# Patient Record
Sex: FEMALE | Race: WHITE | Employment: OTHER | URBAN - METROPOLITAN AREA
[De-identification: names, ages, dates, MRNs, and addresses within clinical notes are randomized per-mention and may not be internally consistent; named-entity substitution may affect disease eponyms.]

---

## 2017-08-03 ENCOUNTER — HOSPITAL ENCOUNTER (OUTPATIENT)
Dept: CT IMAGING | Facility: HOSPITAL | Age: 77
Discharge: HOME | End: 2017-08-03
Attending: INTERNAL MEDICINE
Payer: MEDICARE

## 2017-08-03 DIAGNOSIS — R51.9 HEAD ACHE: ICD-10-CM

## 2017-08-03 DIAGNOSIS — R42 DIZZINESS: ICD-10-CM

## 2017-08-03 PROCEDURE — 70450 CT HEAD/BRAIN W/O DYE: CPT | Performed by: INTERNAL MEDICINE

## 2017-12-08 ENCOUNTER — HOSPITAL ENCOUNTER (EMERGENCY)
Facility: HOSPITAL | Age: 77
Discharge: HOME | End: 2017-12-08
Attending: EMERGENCY MEDICINE
Payer: MEDICARE

## 2017-12-08 ENCOUNTER — APPOINTMENT (OUTPATIENT)
Dept: MRI IMAGING | Facility: HOSPITAL | Age: 77
End: 2017-12-08
Attending: EMERGENCY MEDICINE
Payer: MEDICARE

## 2017-12-08 ENCOUNTER — APPOINTMENT (OUTPATIENT)
Dept: GENERAL RADIOLOGY | Facility: HOSPITAL | Age: 77
End: 2017-12-08
Attending: EMERGENCY MEDICINE
Payer: MEDICARE

## 2017-12-08 VITALS
TEMPERATURE: 98 F | HEART RATE: 65 BPM | SYSTOLIC BLOOD PRESSURE: 128 MMHG | DIASTOLIC BLOOD PRESSURE: 77 MMHG | OXYGEN SATURATION: 97 % | WEIGHT: 140 LBS | RESPIRATION RATE: 16 BRPM

## 2017-12-08 DIAGNOSIS — R42 VERTIGO: Primary | ICD-10-CM

## 2017-12-08 PROCEDURE — 70549 MR ANGIOGRAPH NECK W/O&W/DYE: CPT | Performed by: EMERGENCY MEDICINE

## 2017-12-08 PROCEDURE — 85025 COMPLETE CBC W/AUTO DIFF WBC: CPT | Performed by: EMERGENCY MEDICINE

## 2017-12-08 PROCEDURE — 70546 MR ANGIOGRAPH HEAD W/O&W/DYE: CPT | Performed by: EMERGENCY MEDICINE

## 2017-12-08 PROCEDURE — A9575 INJ GADOTERATE MEGLUMI 0.1ML: HCPCS | Performed by: EMERGENCY MEDICINE

## 2017-12-08 PROCEDURE — 96374 THER/PROPH/DIAG INJ IV PUSH: CPT

## 2017-12-08 PROCEDURE — 93005 ELECTROCARDIOGRAM TRACING: CPT

## 2017-12-08 PROCEDURE — 82962 GLUCOSE BLOOD TEST: CPT

## 2017-12-08 PROCEDURE — 85610 PROTHROMBIN TIME: CPT | Performed by: EMERGENCY MEDICINE

## 2017-12-08 PROCEDURE — 85730 THROMBOPLASTIN TIME PARTIAL: CPT | Performed by: EMERGENCY MEDICINE

## 2017-12-08 PROCEDURE — 99285 EMERGENCY DEPT VISIT HI MDM: CPT

## 2017-12-08 PROCEDURE — 70553 MRI BRAIN STEM W/O & W/DYE: CPT | Performed by: EMERGENCY MEDICINE

## 2017-12-08 PROCEDURE — 84484 ASSAY OF TROPONIN QUANT: CPT | Performed by: EMERGENCY MEDICINE

## 2017-12-08 PROCEDURE — 71010 XR CHEST AP PORTABLE  (CPT=71010): CPT | Performed by: EMERGENCY MEDICINE

## 2017-12-08 PROCEDURE — 80053 COMPREHEN METABOLIC PANEL: CPT | Performed by: EMERGENCY MEDICINE

## 2017-12-08 PROCEDURE — 93010 ELECTROCARDIOGRAM REPORT: CPT

## 2017-12-08 RX ORDER — MECLIZINE HYDROCHLORIDE 25 MG/1
25 TABLET ORAL 3 TIMES DAILY PRN
Qty: 20 TABLET | Refills: 0 | Status: SHIPPED | OUTPATIENT
Start: 2017-12-08

## 2017-12-08 RX ORDER — ONDANSETRON 8 MG/1
8 TABLET, ORALLY DISINTEGRATING ORAL EVERY 6 HOURS PRN
Qty: 10 TABLET | Refills: 0 | Status: SHIPPED | OUTPATIENT
Start: 2017-12-08 | End: 2017-12-15

## 2017-12-08 RX ORDER — MECLIZINE HYDROCHLORIDE 25 MG/1
25 TABLET ORAL ONCE
Status: DISCONTINUED | OUTPATIENT
Start: 2017-12-08 | End: 2017-12-08

## 2017-12-08 RX ORDER — ONDANSETRON 2 MG/ML
4 INJECTION INTRAMUSCULAR; INTRAVENOUS
Status: DISCONTINUED | OUTPATIENT
Start: 2017-12-08 | End: 2017-12-08

## 2017-12-08 NOTE — ED INITIAL ASSESSMENT (HPI)
Patient arrives from home with  for dizziness onset 1 hr ago as well as speech changes. Reports she is speaking more slowly and pausing between words.  Reports several similar episodes in past, which ended up all being vertigo and was prescribed mecl

## 2017-12-08 NOTE — ED PROVIDER NOTES
Patient Seen in: [facility] Emergency Department    History   Patient presents with:  Stroke (neurologic)    Stated Complaint: slurred speech and dizzy for 1 hour    HPI    History is obtained from patient's son.   About 1 and half hours ago,   BETTYE appropriately. She is oriented times person and place but not the exact day. Her speech according to her son is slow but not slurred and no inappropriate words. There is facial symmetry.   Sensation in the face intact light touch  Eyes: sclera white, con Abnormality         Status                     ---------                               -----------         ------                     CBC W/ DIFFERENTIAL[[id number]]          Normal              Final result                 Please view results for these gissell criteria    On examination, patient appear comfortable. At this point, I believe patient may be safely discharged home. I recommend rest, fluids, light diet, Zofran for nausea, Antivert, and dizziness exercises.   I recommend follow-up with primary care d

## 2017-12-11 ENCOUNTER — TELEPHONE (OUTPATIENT)
Dept: NEUROLOGY | Facility: CLINIC | Age: 77
End: 2017-12-11

## 2019-11-08 ENCOUNTER — HOSPITAL ENCOUNTER (OUTPATIENT)
Facility: HOSPITAL | Age: 79
Setting detail: HOSPITAL OUTPATIENT SURGERY
Discharge: HOME | End: 2019-11-08
Attending: INTERNAL MEDICINE | Admitting: INTERNAL MEDICINE
Payer: MEDICARE

## 2019-11-08 ENCOUNTER — ANESTHESIA (OUTPATIENT)
Dept: ENDOSCOPY | Facility: HOSPITAL | Age: 79
End: 2019-11-08
Payer: MEDICARE

## 2019-11-08 ENCOUNTER — ANESTHESIA EVENT (OUTPATIENT)
Dept: ENDOSCOPY | Facility: HOSPITAL | Age: 79
End: 2019-11-08
Payer: MEDICARE

## 2019-11-08 VITALS
WEIGHT: 140 LBS | SYSTOLIC BLOOD PRESSURE: 154 MMHG | HEIGHT: 62 IN | BODY MASS INDEX: 25.76 KG/M2 | HEART RATE: 59 BPM | OXYGEN SATURATION: 100 % | TEMPERATURE: 98 F | RESPIRATION RATE: 18 BRPM | DIASTOLIC BLOOD PRESSURE: 79 MMHG

## 2019-11-08 DIAGNOSIS — Z12.11 SCREENING FOR COLON CANCER: ICD-10-CM

## 2019-11-08 PROCEDURE — 0DJD8ZZ INSPECTION OF LOWER INTESTINAL TRACT, VIA NATURAL OR ARTIFICIAL OPENING ENDOSCOPIC: ICD-10-PCS | Performed by: INTERNAL MEDICINE

## 2019-11-08 RX ORDER — LIDOCAINE HYDROCHLORIDE 10 MG/ML
INJECTION, SOLUTION EPIDURAL; INFILTRATION; INTRACAUDAL; PERINEURAL AS NEEDED
Status: DISCONTINUED | OUTPATIENT
Start: 2019-11-08 | End: 2019-11-08 | Stop reason: SURG

## 2019-11-08 RX ORDER — SODIUM CHLORIDE, SODIUM LACTATE, POTASSIUM CHLORIDE, CALCIUM CHLORIDE 600; 310; 30; 20 MG/100ML; MG/100ML; MG/100ML; MG/100ML
INJECTION, SOLUTION INTRAVENOUS CONTINUOUS
Status: DISCONTINUED | OUTPATIENT
Start: 2019-11-08 | End: 2019-11-08

## 2019-11-08 RX ORDER — NALOXONE HYDROCHLORIDE 0.4 MG/ML
80 INJECTION, SOLUTION INTRAMUSCULAR; INTRAVENOUS; SUBCUTANEOUS AS NEEDED
Status: DISCONTINUED | OUTPATIENT
Start: 2019-11-08 | End: 2019-11-08

## 2019-11-08 RX ADMIN — LIDOCAINE HYDROCHLORIDE 50 MG: 10 INJECTION, SOLUTION EPIDURAL; INFILTRATION; INTRACAUDAL; PERINEURAL at 10:11:00

## 2019-11-08 NOTE — OPERATIVE REPORT
Operative Report-Colonoscopy    Luz Marina Griffin 11/20/1940   [facility] [id number] MRN [id number]   Admission Date 11/8/2019 Operation Date 11/8/2019   Attending Physician Boom Cifuentes DO Operating Physician Verónica Cuellar DO     PREOPERATIVE DIAGNOSIS/

## 2019-11-08 NOTE — H&P
History & Physical Examination    Patient Name: Dayana Diggs  MRN: [id number]  CSN: [id number]  YOB: 1940    Diagnosis: screening    Present Illness: as above    metoprolol succinate 12.5 mg Oral Tab, Take 12.5 mg by mouth Daily Beta Bl

## 2019-11-08 NOTE — ANESTHESIA PREPROCEDURE EVALUATION
PRE-OP EVALUATION    Patient Name: Cheryle Lin    Pre-op Diagnosis: Screening for colon cancer [Z12.11]    Procedure(s):  COLONOSCOPY    Surgeon(s) and Role:     * Jose Trevizo, DO - Primary    Pre-op vitals reviewed.   Temp: 98.4 °F (36.9 °C)  P MAC  NPO status verified and patient meets guidelines. Post-procedure pain management plan discussed with surgeon and patient.       Plan/risks discussed with: patient                Options, risks and benefits of anesthesia as outlined in the anesthesia

## 2019-11-08 NOTE — ANESTHESIA POSTPROCEDURE EVALUATION
[facility] Patient Status:  Hospital Outpatient Surgery   Age/Gender 69year old female MRN [id number]   [facility] ENDOSCOPY Attending Ruperto Alanis, [address] [facility] Day # 0 PCP Conner Valdes MD       Anesth

## 2020-12-22 ENCOUNTER — WALK IN (OUTPATIENT)
Dept: URGENT CARE | Age: 80
End: 2020-12-22
Attending: FAMILY MEDICINE

## 2020-12-22 VITALS
RESPIRATION RATE: 18 BRPM | HEART RATE: 68 BPM | TEMPERATURE: 96.5 F | OXYGEN SATURATION: 100 % | DIASTOLIC BLOOD PRESSURE: 83 MMHG | SYSTOLIC BLOOD PRESSURE: 170 MMHG

## 2020-12-22 DIAGNOSIS — Z20.822 SUSPECTED COVID-19 VIRUS INFECTION: Primary | ICD-10-CM

## 2020-12-22 DIAGNOSIS — R43.2 LOSS OF TASTE: ICD-10-CM

## 2020-12-22 DIAGNOSIS — R52 BODY ACHES: ICD-10-CM

## 2020-12-22 PROCEDURE — C9803 HOPD COVID-19 SPEC COLLECT: HCPCS

## 2020-12-22 PROCEDURE — U0005 INFEC AGEN DETEC AMPLI PROBE: HCPCS | Performed by: FAMILY MEDICINE

## 2020-12-22 PROCEDURE — 99202 OFFICE O/P NEW SF 15 MIN: CPT

## 2020-12-23 ENCOUNTER — TELEPHONE (OUTPATIENT)
Dept: SCHEDULING | Age: 80
End: 2020-12-23

## 2020-12-23 LAB
SARS-COV-2 RNA RESP QL NAA+PROBE: NOT DETECTED
SERVICE CMNT-IMP: NORMAL
SERVICE CMNT-IMP: NORMAL

## 2020-12-24 ASSESSMENT — ENCOUNTER SYMPTOMS
SHORTNESS OF BREATH: 0
DIARRHEA: 0
FEVER: 0
DIZZINESS: 0
VOMITING: 0
NAUSEA: 0
SINUS PRESSURE: 0
COUGH: 0
AGITATION: 0
ABDOMINAL PAIN: 0
FATIGUE: 1
ADENOPATHY: 0
WEAKNESS: 0

## 2020-12-29 ENCOUNTER — TELEPHONE (OUTPATIENT)
Dept: SCHEDULING | Age: 80
End: 2020-12-29

## 2024-01-23 PROBLEM — I10 ESSENTIAL (PRIMARY) HYPERTENSION: Status: ACTIVE | Noted: 2024-01-23

## 2024-01-23 PROBLEM — E03.8 OTHER SPECIFIED HYPOTHYROIDISM: Status: ACTIVE | Noted: 2024-01-23

## 2024-01-23 PROBLEM — Z00.00 MEDICARE ANNUAL WELLNESS VISIT, INITIAL: Status: ACTIVE | Noted: 2024-01-23

## 2024-01-23 PROBLEM — E78.49 OTHER HYPERLIPIDEMIA: Status: ACTIVE | Noted: 2024-01-23

## 2024-02-02 PROBLEM — E03.9 HYPOTHYROIDISM: Status: ACTIVE | Noted: 2024-01-23

## 2024-02-02 PROBLEM — E78.5 HYPERLIPIDEMIA: Status: ACTIVE | Noted: 2024-01-23

## 2024-02-02 PROBLEM — I83.812 VARICOSE VEINS OF LEFT LOWER EXTREMITY WITH PAIN: Status: ACTIVE | Noted: 2024-02-02

## 2024-02-06 PROBLEM — N39.41 URGE INCONTINENCE OF URINE: Status: ACTIVE | Noted: 2024-02-06

## 2024-03-06 ENCOUNTER — APPOINTMENT (OUTPATIENT)
Dept: CARDIOLOGY | Age: 84
End: 2024-03-06

## 2024-03-06 VITALS
DIASTOLIC BLOOD PRESSURE: 80 MMHG | HEART RATE: 53 BPM | WEIGHT: 145.83 LBS | HEIGHT: 58 IN | SYSTOLIC BLOOD PRESSURE: 140 MMHG | OXYGEN SATURATION: 100 % | RESPIRATION RATE: 16 BRPM | BODY MASS INDEX: 30.61 KG/M2

## 2024-03-06 DIAGNOSIS — I83.812 VARICOSE VEINS OF LEFT LOWER EXTREMITY WITH PAIN: Primary | ICD-10-CM

## 2024-03-06 DIAGNOSIS — I10 ESSENTIAL HYPERTENSION: ICD-10-CM

## 2024-03-06 DIAGNOSIS — E78.2 MIXED HYPERLIPIDEMIA: ICD-10-CM

## 2024-03-06 DIAGNOSIS — I49.3 FREQUENT PVCS: ICD-10-CM

## 2024-03-06 DIAGNOSIS — R94.31 ABNORMAL ELECTROCARDIOGRAM (ECG) (EKG): ICD-10-CM

## 2024-03-06 DIAGNOSIS — E03.9 HYPOTHYROIDISM, UNSPECIFIED TYPE: ICD-10-CM

## 2024-03-06 PROCEDURE — 93000 ELECTROCARDIOGRAM COMPLETE: CPT | Performed by: INTERNAL MEDICINE

## 2024-03-06 PROCEDURE — 99205 OFFICE O/P NEW HI 60 MIN: CPT | Performed by: INTERNAL MEDICINE

## 2024-03-06 ASSESSMENT — PATIENT HEALTH QUESTIONNAIRE - PHQ9
CLINICAL INTERPRETATION OF PHQ2 SCORE: NO FURTHER SCREENING NEEDED
CLINICAL INTERPRETATION OF PHQ2 SCORE: NO FURTHER SCREENING NEEDED
SUM OF ALL RESPONSES TO PHQ9 QUESTIONS 1 AND 2: 0
2. FEELING DOWN, DEPRESSED OR HOPELESS: NOT AT ALL
1. LITTLE INTEREST OR PLEASURE IN DOING THINGS: NOT AT ALL
SUM OF ALL RESPONSES TO PHQ9 QUESTIONS 1 AND 2: 0
1. LITTLE INTEREST OR PLEASURE IN DOING THINGS: NOT AT ALL
SUM OF ALL RESPONSES TO PHQ9 QUESTIONS 1 AND 2: 0
SUM OF ALL RESPONSES TO PHQ9 QUESTIONS 1 AND 2: 0
2. FEELING DOWN, DEPRESSED OR HOPELESS: NOT AT ALL

## 2024-04-03 ENCOUNTER — TELEPHONE (OUTPATIENT)
Dept: CARDIOLOGY | Age: 84
End: 2024-04-03

## 2024-04-22 ENCOUNTER — TELEPHONE (OUTPATIENT)
Dept: CARDIOLOGY | Age: 84
End: 2024-04-22

## 2024-04-23 PROBLEM — I83.90 VARICOSE VEINS OF LOWER EXTREMITY: Status: ACTIVE | Noted: 2024-04-23

## 2024-04-25 PROBLEM — L03.116 CELLULITIS OF LEFT LOWER EXTREMITY: Status: ACTIVE | Noted: 2024-04-25

## 2024-04-30 ENCOUNTER — APPOINTMENT (OUTPATIENT)
Dept: CARDIOLOGY | Age: 84
End: 2024-04-30
Attending: INTERNAL MEDICINE

## 2024-04-30 DIAGNOSIS — I49.3 FREQUENT PVCS: ICD-10-CM

## 2024-04-30 DIAGNOSIS — E03.9 HYPOTHYROIDISM, UNSPECIFIED TYPE: ICD-10-CM

## 2024-04-30 DIAGNOSIS — R94.31 ABNORMAL ELECTROCARDIOGRAM (ECG) (EKG): ICD-10-CM

## 2024-04-30 DIAGNOSIS — I10 ESSENTIAL HYPERTENSION: ICD-10-CM

## 2024-04-30 DIAGNOSIS — I83.812 VARICOSE VEINS OF LEFT LOWER EXTREMITY WITH PAIN: ICD-10-CM

## 2024-04-30 DIAGNOSIS — E78.2 MIXED HYPERLIPIDEMIA: ICD-10-CM

## 2024-05-02 ENCOUNTER — APPOINTMENT (OUTPATIENT)
Dept: MRI IMAGING | Facility: HOSPITAL | Age: 84
End: 2024-05-02
Attending: EMERGENCY MEDICINE
Payer: MEDICARE

## 2024-05-02 ENCOUNTER — HOSPITAL ENCOUNTER (EMERGENCY)
Facility: HOSPITAL | Age: 84
Discharge: HOME | End: 2024-05-02
Attending: EMERGENCY MEDICINE
Payer: MEDICARE

## 2024-05-02 VITALS
WEIGHT: 145 LBS | DIASTOLIC BLOOD PRESSURE: 73 MMHG | RESPIRATION RATE: 18 BRPM | HEIGHT: 60 IN | SYSTOLIC BLOOD PRESSURE: 141 MMHG | HEART RATE: 77 BPM | BODY MASS INDEX: 28.47 KG/M2 | OXYGEN SATURATION: 96 % | TEMPERATURE: 97 F

## 2024-05-02 DIAGNOSIS — R51.9 CHRONIC NONINTRACTABLE HEADACHE, UNSPECIFIED HEADACHE TYPE: Primary | ICD-10-CM

## 2024-05-02 DIAGNOSIS — G89.29 CHRONIC NONINTRACTABLE HEADACHE, UNSPECIFIED HEADACHE TYPE: Primary | ICD-10-CM

## 2024-05-02 LAB
ALBUMIN SERPL-MCNC: 3.5 G/DL (ref 3.4–5)
ALBUMIN/GLOB SERPL: 0.8 {RATIO} (ref 1–2)
ALP LIVER SERPL-CCNC: 59 U/L
ALT SERPL-CCNC: 17 U/L
ANION GAP SERPL CALC-SCNC: 5 MMOL/L (ref 0–18)
AST SERPL-CCNC: 14 U/L (ref 15–37)
BASOPHILS # BLD AUTO: 0.05 X10(3) UL (ref 0–0.2)
BASOPHILS NFR BLD AUTO: 0.7 %
BILIRUB SERPL-MCNC: 0.6 MG/DL (ref 0.1–2)
BUN BLD-MCNC: 24 MG/DL (ref 9–23)
CALCIUM BLD-MCNC: 9.1 MG/DL (ref 8.5–10.1)
CHLORIDE SERPL-SCNC: 109 MMOL/L (ref 98–112)
CO2 SERPL-SCNC: 23 MMOL/L (ref 21–32)
CREAT BLD-MCNC: 1.12 MG/DL
EGFRCR SERPLBLD CKD-EPI 2021: 49 ML/MIN/1.73M2 (ref 60–?)
EOSINOPHIL # BLD AUTO: 0.16 X10(3) UL (ref 0–0.7)
EOSINOPHIL NFR BLD AUTO: 2.1 %
ERYTHROCYTE [DISTWIDTH] IN BLOOD BY AUTOMATED COUNT: 12.1 %
ERYTHROCYTE [SEDIMENTATION RATE] IN BLOOD: 23 MM/HR
GLOBULIN PLAS-MCNC: 4.6 G/DL (ref 2.8–4.4)
GLUCOSE BLD-MCNC: 113 MG/DL (ref 70–99)
HCT VFR BLD AUTO: 35.6 %
HGB BLD-MCNC: 12 G/DL
IMM GRANULOCYTES # BLD AUTO: 0.01 X10(3) UL (ref 0–1)
IMM GRANULOCYTES NFR BLD: 0.1 %
LYMPHOCYTES # BLD AUTO: 1.99 X10(3) UL (ref 1–4)
LYMPHOCYTES NFR BLD AUTO: 26.6 %
MCH RBC QN AUTO: 32.6 PG (ref 26–34)
MCHC RBC AUTO-ENTMCNC: 33.7 G/DL (ref 31–37)
MCV RBC AUTO: 96.7 FL
MONOCYTES # BLD AUTO: 0.49 X10(3) UL (ref 0.1–1)
MONOCYTES NFR BLD AUTO: 6.6 %
NEUTROPHILS # BLD AUTO: 4.78 X10 (3) UL (ref 1.5–7.7)
NEUTROPHILS # BLD AUTO: 4.78 X10(3) UL (ref 1.5–7.7)
NEUTROPHILS NFR BLD AUTO: 63.9 %
OSMOLALITY SERPL CALC.SUM OF ELEC: 289 MOSM/KG (ref 275–295)
PLATELET # BLD AUTO: 196 10(3)UL (ref 150–450)
POTASSIUM SERPL-SCNC: 4.7 MMOL/L (ref 3.5–5.1)
PROT SERPL-MCNC: 8.1 G/DL (ref 6.4–8.2)
RBC # BLD AUTO: 3.68 X10(6)UL
SODIUM SERPL-SCNC: 137 MMOL/L (ref 136–145)
T4 FREE SERPL-MCNC: 1 NG/DL (ref 0.8–1.7)
TSI SER-ACNC: 5.69 MIU/ML (ref 0.36–3.74)
WBC # BLD AUTO: 7.5 X10(3) UL (ref 4–11)

## 2024-05-02 PROCEDURE — 85652 RBC SED RATE AUTOMATED: CPT | Performed by: EMERGENCY MEDICINE

## 2024-05-02 PROCEDURE — 99285 EMERGENCY DEPT VISIT HI MDM: CPT

## 2024-05-02 PROCEDURE — 85025 COMPLETE CBC W/AUTO DIFF WBC: CPT | Performed by: EMERGENCY MEDICINE

## 2024-05-02 PROCEDURE — 84439 ASSAY OF FREE THYROXINE: CPT | Performed by: EMERGENCY MEDICINE

## 2024-05-02 PROCEDURE — 36415 COLL VENOUS BLD VENIPUNCTURE: CPT

## 2024-05-02 PROCEDURE — 80053 COMPREHEN METABOLIC PANEL: CPT | Performed by: EMERGENCY MEDICINE

## 2024-05-02 PROCEDURE — 84443 ASSAY THYROID STIM HORMONE: CPT | Performed by: EMERGENCY MEDICINE

## 2024-05-02 PROCEDURE — 70553 MRI BRAIN STEM W/O & W/DYE: CPT | Performed by: EMERGENCY MEDICINE

## 2024-05-02 PROCEDURE — A9575 INJ GADOTERATE MEGLUMI 0.1ML: HCPCS | Performed by: EMERGENCY MEDICINE

## 2024-05-02 RX ORDER — GADOTERATE MEGLUMINE 376.9 MG/ML
15 INJECTION INTRAVENOUS
Status: COMPLETED | OUTPATIENT
Start: 2024-05-02 | End: 2024-05-02

## 2024-05-02 RX ORDER — SOLIFENACIN SUCCINATE 5 MG/1
5 TABLET, FILM COATED ORAL DAILY
COMMUNITY

## 2024-05-02 RX ORDER — NAPROXEN 500 MG/1
500 TABLET ORAL 2 TIMES DAILY PRN
Qty: 20 TABLET | Refills: 0 | Status: SHIPPED | OUTPATIENT
Start: 2024-05-02

## 2024-05-02 RX ORDER — TELMISARTAN 40 MG/1
40 TABLET ORAL DAILY
COMMUNITY

## 2024-05-02 RX ORDER — LOSARTAN POTASSIUM 25 MG/1
40 TABLET ORAL DAILY
COMMUNITY

## 2024-05-02 RX ORDER — CEPHALEXIN 500 MG/1
500 CAPSULE ORAL 2 TIMES DAILY
COMMUNITY

## 2024-05-02 RX ORDER — GABAPENTIN 300 MG/1
300 CAPSULE ORAL NIGHTLY
Qty: 30 CAPSULE | Refills: 0 | Status: SHIPPED | OUTPATIENT
Start: 2024-05-02 | End: 2024-06-01

## 2024-05-02 NOTE — ED INITIAL ASSESSMENT (HPI)
Past 5 months, patient has been having intermittent episodes of headache, hot flashes, head feeling heavy and foggy, today specifically she felt as though she couldn't move her mouth. Patient wearing halter monitor since yesterday due to having dysrhythmias.

## 2024-05-02 NOTE — ED PROVIDER NOTES
Patient Seen in: [facility] Emergency Department      History     Chief Complaint   Patient presents with    Headache     Stated Complaint: Pt presents to ED for intermittent headaches x5 months that have increased in s*    Subjective:   HPI    83 old female presents to the emergency department with complaints of having issues with headaches that been ongoing for about 5 months but today is having some increasing pain.  Per the daughter she was having some issues even trying to open her mouth that seem to be more pain rather than a neurologic deficit.  Patient is also had some issues with vertigo.  She has not had any imaging of her head during all this.  No fevers or chills.  No other focal weakness or numbness.  She does have a history of some mild dementia.  Daughter states that they were told to come in for evaluation if she was having any progression of her headache and subsequently they came in.  No loss of bowel or bladder control.  When she does get the episodes of pain they tend to be more in the morning but can occur at any time.  She states sometimes it involves her neck as well.  No fevers or chills.  No cough cold or congestion.  No other acute complaints    Objective:   Past Medical History:    Chest pain    Constipation    Essential hypertension    Frequent urination    High blood pressure    High cholesterol    Irregular bowel habits    Leg swelling    Thyroid disease              Past Surgical History:   Procedure Laterality Date    Colonoscopy N/A 11/8/2019    Procedure: COLONOSCOPY;  Surgeon: Rose [facility] DO;  Location:  ENDOSCOPY                Social History     Socioeconomic History    Marital status: Single   Tobacco Use    Smoking status: Never    Smokeless tobacco: Never   Vaping Use    Vaping status: Never Used   Substance and Sexual Activity    Alcohol use: Never    Drug use: Never     Social Determinants of Health     Financial Resource Strain: Low Risk  (4/23/2024)     Received from [facility]    Financial Resource Strain     In the past year, have you or any family members you live with been unable to get any of the following when it was really needed? Check all that apply.: None   Food Insecurity: Low Risk  (4/23/2024)    Received from [facility]    Food Insecurity     Within the past 12 months, you worried that your food would run out before you got money to buy more.  : Never true     Within the past 12 months, the food you bought just didn't last and you didn't have money to get more. : Never true   Transportation Needs: Not At Risk (4/23/2024)    Received from [facility]    Transportation Needs     In the past 12 months, has lack of reliable transportation kept you from medical appointments, meetings, work or from getting things needed for daily living? : No   Physical Activity: Low Risk  (4/23/2024)    Received from [facility]    Exercise Vital Sign     On average, how many days per week do you engage in moderate to strenuous exercise (like a brisk walk)?: 7 days     On average, how many minutes do you engage in exercise at this level?: 60 min   Stress: Low Risk  (4/23/2024)    Received from [facility]    Stress     Stress is when someone feels tense, nervous, anxious, or can't sleep at night because their mind is troubled. How stressed are you? : Not at all   Social Connections: Low Risk  (4/23/2024)    Received from [facility]    Social Connections     How often do you see or talk to people that you care about and feel close to? (For example: talking to friends on the phone, visiting friends or family, going to [employer] or club meetings): 5 or more times a week              Review of Systems   All other systems reviewed and are negative.      Positive for stated complaint: Pt presents to ED for intermittent headaches x5 months that have increased in s*  Other systems are as noted in HPI.  Constitutional  and vital signs reviewed.      All other systems reviewed and negative except as noted above.    Physical Exam     ED Triage Vitals   BP 05/02/24 0932 (!) 157/92   Pulse 05/02/24 0932 85   Resp 05/02/24 0932 17   Temp 05/02/24 1241 97.4 °F (36.3 °C)   Temp src 05/02/24 1241 Temporal   SpO2 05/02/24 0932 99 %   O2 Device 05/02/24 0930 None (Room air)       Current:/73   Pulse 77   Temp 97.4 °F (36.3 °C) (Temporal)   Resp 18   Ht 152.4 cm (5')   Wt 65.8 kg   SpO2 96%   BMI 28.32 kg/m²         Physical Exam  Vitals and nursing note reviewed. Chaperone present: Daughter in room assisting with history and also used language line.   Constitutional:       Appearance: She is well-developed.   HENT:      Head: Normocephalic and atraumatic.      Mouth/Throat:      Mouth: Mucous membranes are moist.   Eyes:      Extraocular Movements: Extraocular movements intact.      Pupils: Pupils are equal, round, and reactive to light.      Comments: Bilateral arcus senilis   Cardiovascular:      Rate and Rhythm: Normal rate and regular rhythm.      Pulses: Normal pulses.      Heart sounds: Normal heart sounds.   Pulmonary:      Effort: Pulmonary effort is normal.      Breath sounds: Normal breath sounds. No stridor. No wheezing.   Abdominal:      General: Bowel sounds are normal.      Palpations: Abdomen is soft.      Tenderness: There is no abdominal tenderness. There is no rebound.   Musculoskeletal:         General: No tenderness. Normal range of motion.      Cervical back: Normal range of motion and neck supple.   Lymphadenopathy:      Cervical: No cervical adenopathy.   Skin:     General: Skin is warm and dry.      Coloration: Skin is not pale.   Neurological:      General: No focal deficit present.      Mental Status: She is alert and oriented to person, place, and time. Mental status is at baseline.      Cranial Nerves: No cranial nerve deficit.      Coordination: Coordination normal.      Comments: NIH of 0               ED Course     Labs Reviewed   COMP METABOLIC PANEL (14) - Abnormal; Notable for the following components:       Result Value    Glucose 113 (*)     BUN 24 (*)     Creatinine 1.12 (*)     eGFR-Cr 49 (*)     AST 14 (*)     Globulin  4.6 (*)     A/G Ratio 0.8 (*)     All other components within normal limits   TSH W REFLEX TO FREE T4 - Abnormal; Notable for the following components:    TSH 5.690 (*)     All other components within normal limits   CBC W/ DIFFERENTIAL - Abnormal; Notable for the following components:    RBC 3.68 (*)     All other components within normal limits   SED RATE, WESTERGREN (AUTOMATED) - Normal   T4, FREE (S) - Normal   CBC WITH DIFFERENTIAL WITH PLATELET    Narrative:     The following orders were created for panel order CBC With Differential With Platelet.  Procedure                               Abnormality         Status                     ---------                               -----------         ------                     CBC W/ DIFFERENTIAL[[id number]]          Abnormal            Final result                 Please view results for these tests on the individual orders.   RAINBOW DRAW GOLD   RAINBOW DRAW BLUE             MRI BRAIN (W+WO) (CPT=70553)    Result Date: 5/2/2024  PROCEDURE:  MRI BRAIN (W+WO) (CPT=70553)  COMPARISON:  [facility] , MR, MRI BRAIN MRA HEAD+MRA NECK (ALL W+WO) (CPT=70553/64529/04357), 12/08/2017, 2:38 PM.  INDICATIONS:  Pt presents to ED for intermittent headaches x5 months that have increased in severity.  TECHNIQUE:  MRI of the brain was performed with multi-planar T1, T2-weighted images with FLAIR sequences and diffusion weighted images without and with infusion.  PATIENT STATED HISTORY:(As transcribed by Technologist)  Patient has been having intermittent episodes of headache, hot flashes, head feeling heavy and foggy for the past five months. Today patient felt like unable to move the mouth.   CONTRAST USED:  13 mL of Dotarem  FINDINGS:   Mild prominence of  the sulci.   There is no midline shift or mass effect.   The basal cisterns are patent.   The craniocervical junction is unremarkable.   Midline structures including corpus callosum, optic chiasm and cerebellar tonsils are overall unremarkable.  There is no acute intracranial hemorrhage or extra-axial fluid collection identified.   Scattered mild to moderate FLAIR abnormalities the white matter are again noted.   No significant abnormal parenchymal gradient susceptibility.   No new enhancing lesion.  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  There is no restricted diffusion to suggest acute ischemia/infarction.  The visualized paranasal sinuses and mastoid air cells are unremarkable.   The expected major intracranial flow voids are present.            CONCLUSION:   1. No evidence of an acute infarct  2. Scattered mild to moderate FLAIR abnormalities the white matter are noted.  These are likely sequelae of chronic small vessel ischemic disease.  3. No new suspicious enhancing lesions.  4. No significant change since prior exam.   LOCATION:  [facility]    Dictated by (CST): Daniel Keen MD on 5/02/2024 at 12:38 PM     Finalized by (CST): Daniel Keen MD on 5/02/2024 at 12:40 PM              MDM      Patient had IV established and blood work obtained.  Currently she has no focal neurologic symptoms we did an NIH and this was 0.  She is currently comfortable.  With her advanced age and intermittent symptoms there is certainly concern about the potential of temporal arteritis.  However her sedimentation rate is normal and currently 1 exam her she has no tenderness in the temporal region.  She does not have any fevers or chills be concerned about infectious etiology.  She underwent MRI with and without contrast and I reviewed those images do not appreciate any obvious bleed or midline shift.  Reviewed  radiology report they note no evidence of acute infarct they noted scattered white matter changes consistent with chronic small vessel ischemic disease which would be consistent with her advanced age.  However there is no acute findings that would require intervention.  She continued to be pain-free.  I discussed with the daughter this could be more occipital type migraines or could be more musculoskeletal.  At this time she will be given referral to further evaluate her headaches and medications for pain control.  We can try gabapentin.  She will be given NSAIDs as needed.  She was discharged in good condition                                   Medical Decision Making      Disposition and Plan     Clinical Impression:  1. Chronic nonintractable headache, unspecified headache type         Disposition:  Discharge  5/2/2024  2:10 pm    Follow-up:  [address] [place] [address] [place] [id number] [phone]  Schedule an appointment as soon as possible for a visit            Medications Prescribed:  Discharge Medication List as of 5/2/2024  2:11 PM        START taking these medications    Details   naproxen 500 MG Oral Tab Take 1 tablet (500 mg total) by mouth 2 (two) times daily as needed., Normal, Disp-20 tablet, R-0      gabapentin 300 MG Oral Cap Take 1 capsule (300 mg total) by mouth nightly., Normal, Disp-30 capsule, R-0

## 2024-05-07 NOTE — PROGRESS NOTES
CHIEF COMPLAINT:     Chief Complaint   Patient presents with    Wound Care     Patient is here for initial visit. She scratched her left ankle a month ago and the wound has no healed. They have been putting neosporin and bordered gauze.      HPI:   Information obtained from patient and chart  5-8-24 INITIAL  84 yo with pmhx of htn, high cholesterol, venous insufficiency with ulceration.  She presented to [facility] with left foot ulceration, patient was started on Keflex, which patient completed today.  Patient is here today with her son. She has been utilizing neosporin to the wound.  Her recent esr was wnl. Son reports that patient had ulcerations many years ago, and then when she went to [facility] for many years they healed.  She returned here to the [facility] and is living with her son and has redeveloped the ulceration. He states she sleeps in a bed, but spends a lot of time in the kitchen at the counter even though they advise her to sit and elevate her legs.  She has scarring and hypopigmentation noted on the left leg and venous changes bilaterally.  They have seen a Dr. Sinclair (son is not sure what organization md is with) and they will be doing a vein mapping at the end of may.  We discussed that the ulceration is directly over the malleloous. Will get xray. Will start compression and advanced wound dressings. Today there is not any s/s of infection. Patient does have pain with movement and touch to the area. But no visible movement in the wound bed.      MEDICATIONS:     Current Outpatient Medications:     losartan 25 MG Oral Tab, Take 40 mg by mouth daily., Disp: , Rfl:     Solifenacin Succinate 5 MG Oral Tab, Take 1 tablet (5 mg total) by mouth daily., Disp: , Rfl:     telmisartan 40 MG Oral Tab, Take 1 tablet (40 mg total) by mouth daily., Disp: , Rfl:     naproxen 500 MG Oral Tab, Take 1 tablet (500 mg total) by mouth 2 (two) times daily as needed., Disp: 20 tablet, Rfl: 0    gabapentin 300 MG Oral Cap, Take 1  capsule (300 mg total) by mouth nightly., Disp: 30 capsule, Rfl: 0    metoprolol succinate 12.5 mg Oral Tab, Take 1 Partial Tablet (12.5 mg total) by mouth Daily Beta Blocker., Disp: , Rfl:     Levothyroxine Sodium 100 MCG Oral Tab, Take 1 tablet (100 mcg total) by mouth before breakfast., Disp: , Rfl:     atorvastatin 10 MG Oral Tab, Take 1 tablet (10 mg total) by mouth nightly., Disp: , Rfl:     Meclizine HCl 25 MG Oral Tab, Take 1 tablet (25 mg total) by mouth 3 (three) times daily as needed., Disp: 20 tablet, Rfl: 0    cephalexin 500 MG Oral Cap, Take 1 capsule (500 mg total) by mouth 2 (two) times daily. (Patient not taking: Reported on 5/8/2024), Disp: , Rfl:   ALLERGIES:   No Known Allergies   REVIEW OF SYSTEMS:   This information was obtained from the patient/family and chart.    See HPI for pertinent positives, otherwise 10 pt ROS negative.  Review of Systems   HISTORY:     Past Medical History:    Chest pain    Constipation    Essential hypertension    Frequent urination    High blood pressure    High cholesterol    Irregular bowel habits    Leg swelling    Thyroid disease     Past Surgical History:   Procedure Laterality Date    Colonoscopy N/A 11/8/2019    Procedure: COLONOSCOPY;  Surgeon: Rose Edwards DO;  Location:  ENDOSCOPY      Social History     Socioeconomic History    Marital status: Single   Tobacco Use    Smoking status: Never    Smokeless tobacco: Never   Vaping Use    Vaping status: Never Used   Substance and Sexual Activity    Alcohol use: Never    Drug use: Never     Social Determinants of Health     Financial Resource Strain: Low Risk  (4/23/2024)    Received from [employer]    Financial Resource Strain     In the past year, have you or any family members you live with been unable to get any of the following when it was really needed? Check all that apply.: None   Food Insecurity: Low Risk  (4/23/2024)    Received from [facility]    Food Insecurity     Within  the past 12 months, you worried that your food would run out before you got money to buy more.  : Never true     Within the past 12 months, the food you bought just didn't last and you didn't have money to get more. : Never true   Transportation Needs: Not At Risk (4/23/2024)    Received from [facility]    Transportation Needs     In the past 12 months, has lack of reliable transportation kept you from medical appointments, meetings, work or from getting things needed for daily living? : No   Physical Activity: Low Risk  (4/23/2024)    Received from [facility]    Exercise Vital Sign     On average, how many days per week do you engage in moderate to strenuous exercise (like a brisk walk)?: 7 days     On average, how many minutes do you engage in exercise at this level?: 60 min   Stress: Low Risk  (4/23/2024)    Received from [facility]    Stress     Stress is when someone feels tense, nervous, anxious, or can't sleep at night because their mind is troubled. How stressed are you? : Not at all   Social Connections: Low Risk  (4/23/2024)    Received from [facility]    Social Connections     How often do you see or talk to people that you care about and feel close to? (For example: talking to friends on the phone, visiting friends or family, going to [employer] or club meetings): 5 or more times a week     PHYSICAL EXAM:     Vitals:    05/08/24 1429   BP: 137/74   Pulse: 55   Resp: 18   Temp: 98.1 °F (36.7 °C)   Weight: 119 lb 0.8 oz (54 kg)   Height: 60\"      Estimated body mass index is 23.25 kg/m² as calculated from the following:    Height as of this encounter: 60\".    Weight as of this encounter: 119 lb 0.8 oz (54 kg).   POC Glucose   Date Value Ref Range Status   12/08/2017 128 (H) 65 - 99 mg/dL Final       Vital signs reviewed.Appears stated age, well groomed.    Constitutional:  Bp wnl for patient. Pulse Regular and wnl for patient. Respirations easy and unlabored.  Temperature wnl. Elevated bmi. Appearance neat and clean. Appears in no acute distress. Well nourished and well developed.    Respiratory: Respiratory effort is easy and symmetric bilaterally. Rate is normal at rest and on room air.  Bilateral breath sounds are clear and equal w/ no wheezes, rales or rhonchi.    Cardiovascular:  Heart rhythm and rate regular, without murmur or gallop.     Lower extremity:  dp/pt palpable bilaterally with biphasic signals at the dp/pt/distal hallux bilaterally. Bilateral lower extremities + for varicosities, +edema, +hyperpigmentation bilaterally and hypopigmentation on the left, scarring noted on the left. Capillary refill < 3 seconds. Digits are warm. toenails are wnl for color, thickness and hygeine. Skin hydration wnl. no hairgrowth on legs.    Musculoskeletal:  Gait and station stable   Integumentary:  refer to wound characteristics and images   Psychiatric:  Judgment and insight intact. Alert and oriented times 3. No evidence of depression, anxiety, or agitation. Calm, cooperative, and communicative.   EDEMA:   Calf  Point of Measurement - Left Calf: 31  Point of Measurement - Right Calf: 31  Left Calf from:: Heel  Calf Left cm:: 35  Right Calf from:: Heel  Right Calf cm:: 32  Ankle  Point of Measurement - Left Ankle: 10  Point of Measurement - Right Ankle: 10  Left Ankle from:: Heel  Left Ankle cm:: 20.5     Right Ankle from:: Heel  Right Ankle cm:: 19     DIAGNOSTICS:     Lab Results   Component Value Date    BUN 24 (H) 05/02/2024    CREATSERUM 1.12 (H) 05/02/2024    ALB 3.5 05/02/2024    TP 8.1 05/02/2024       WOUND ASSESSMENT:     Wound 05/08/24 #1- Left Medial Ankle Ankle Left (Active)   Date First Assessed/Time First Assessed: 05/08/24 1421    Wound Number (Wound Clinic Only): #1- Left Medial Ankle  Primary Wound Type: Venous Ulcer  Location: Ankle  Wound Location Orientation: Left      Assessments 5/8/2024  2:22 PM   Wound Image     Drainage Amount Moderate   Drainage  Description Serous;Yellow   Treatments Compression   Wound Length (cm) 2 cm   Wound Width (cm) 2.4 cm   Wound Surface Area (cm^2) 4.8 cm^2   Wound Depth (cm) 0.3 cm   Wound Volume (cm^3) 1.44 cm^3   Margins Well-defined edges   Non-staged Wound Description Full thickness   Meagan-wound Assessment Edema   Wound Bed Slough (%) 100 %   Wound Odor None       No associated orders.       Compression Wrap 24 Ankle Anterior;Left (Active)   Placement Date/Time: 24 1545   Location: Ankle  Wound Location Orientation: Anterior;Left      Assessments 2024  3:45 PM   Response to Treatment Well tolerated   Compression Layers Multilayer   Compression Product Type Unna Boot   Dressing Applied Yes   Compression Wrap Location Toes to Knee   Compression Wrap Status Clean;Dry       No associated orders.          ASSESSMENT AND PLAN:    1. 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 Chronic venous hypertension with ulcer and inflammation, left (HCC)          I discussed with the patient  and son aspects of wound healing includin) blood flow in/out and the difference between arterial and venous and use of appropriate compression  2) discussed wound bed optimization with necrosis and callus removal, moist wound healing.  3) discussed assessing for osteo  4) discussed nutrition       Risks, benefits, and alternatives of current treatment plan discussed in detail.  Questions and concerns addressed. Red flags to RTC or ED reviewed.  Patient (or parent) agrees to plan.      NOTE TO PATIENT: The  [employer] Cures Act makes clinical notes like these available to patients in the interest of transparency. Clinical notes are medical documents used by physicians and care providers to communicate with each other. These documents include medical language and terminology, abbreviations, and treatment information that may  sound technical and at times possibly unfamiliar. In addition, at times, the verbiage may appear blunt or direct. These documents are one tool providers use to communicate relevant information and clinical opinions of the care providers in a way that allows common understanding of the clinical context.   Patient is new to clinic, has not seen v providers previously.  I spent   50   minutes with the patient. This time included:    preparing to see the patient (eg, review notes and recent diagnostics),  seeing the patient, obtaining and/or reviewing separately obtained history, performing a medically appropriate examination and/or evaluation, counseling and educating the patient, documenting in the record, xray  DISCHARGE:      Patient Instructions   Please return:Friday for RN visit for wound assessment, edema management, and if applicable reapplication of multilayer compression bandage system.    1 week with Anay-please schedule every week for 4 weeks    Imaging & Consults:  XR ANKLE (MIN 3 VIEWS), LEFT (CPT=73610)    Patient discharge and wound care instructions  Raissa Pate  5/8/2024     You may shower with protection of the wound (ie a cast cover or similar).     Cleansing/dressing:       In clinic/home health care, with each dressing change:    please cleanse the limb, foot, and between toes with soap, water and washcloth.   Dry thoroughly.    Cleanse/soak the wound with VASHE (or other hypochlorous wound cleanser), dab dry.    Apply the following dressings:   small amount of santyl>xeroform (if at RN visit there is too much moisture substitute transfer for the xeroform)> 30-40mmhg calamine compression    Managing your edema:  Avoid prolonged standing in one place. It is better to have your calf muscles moving to pump fluid out of the legs      Elevate leg(s) above the level of the heart when sitting or as much as possible.  Take you diuretics as directed by your physician. Do not skip doses or change  doses unless instructed to do so by your physician.  Decrease salt intake, follow recommended 2 grams daily.  Do not get leg(s) with compression wrap wet. If wraps are too tight as indicated by pain, numbness/tingling or discoloration of toes remove wrap completely and call the wound center @ [phone].  Refer to the \"Multi-layer compression bandage application patient information sheet\" given to you in clinic.     What Are Compression Wraps?   Compression wraps are elastic bandages that wrap around a part of your body to keep swelling down. The wraps create pressure which pushes extra fluid out of a certain area. This improves blood flow to that part of the body and helps it heal faster.   Compression wraps come in different styles. Your doctor will recommend the best compression wrap for your needs.  How Do I Take Care of a Compression Wrap?   Compression wraps can be worn for up to seven days if you take good care of them. Here's how to make them last and keep them working right:   Keep them clean and dry until your next doctor's appointment.   Wear thin, stretchable stockings over the wrap to hold it in place. This keeps the wrap from sticking to your sheets while sleeping. It also keeps your clothing from sticking to the wrap.   Wear shoes that can fit comfortably around a wrap that covers your leg and foot.   Nutrition and blood sugar control:  Focus on the following:  Protein: Meats, beans, eggs, milk and yogurt particularly Greek yogurt), tofu, soy nuts, soy protein products (Follow the protein handout in your welcome folder)  Vitamin C: Citrus fruits and juices, strawberries, tomatoes, tomato juice, peppers, baked potatoes, spinach, broccoli, cauliflower, [place] sprouts, cabbage  Vitamin A: Dark green, leafy vegetables, orange or yellow vegetables, cantaloupe, fortified dairy products, liver, fortified cereals  Zinc: Fortified cereals, red meats, seafood  Consider supplementing with Dao by [employer]  labs. It can be purchased on amazon, [employer] website, or local pharmacy may be able to order it for you.  (These are essential branch chain amino acids that help with tissue building and wound healing).     Concerns:  Signs of infection may include the following:  Increase in redness  Red \"streaks\" from wound  Increase in swelling  Fever  Unusual odor  Change in the amount of wound drainage     Should you experience any significant changes in your wound(s) or have any questions regarding your home care instructions please contact the [facility] @ [phone] If after regular business hours, please call your family doctor or local emergency room. The treatment plan has been discussed at length between you and your provider. Follow all instructions carefully, it is very important. If you do not follow all instructions you are at risk of your wound not healing, infection, possible loss of limb and even loss of life.    Anay Finn FNP-C, CWCN-AP, CFCN, CSWS, WCC, DWC  5/8/2024

## 2024-05-08 ENCOUNTER — OFFICE VISIT (OUTPATIENT)
Dept: WOUND CARE | Facility: HOSPITAL | Age: 84
End: 2024-05-08
Attending: NURSE PRACTITIONER
Payer: MEDICARE

## 2024-05-08 ENCOUNTER — HOSPITAL ENCOUNTER (OUTPATIENT)
Dept: GENERAL RADIOLOGY | Facility: HOSPITAL | Age: 84
Discharge: HOME | End: 2024-05-08
Attending: NURSE PRACTITIONER
Payer: MEDICARE

## 2024-05-08 VITALS
BODY MASS INDEX: 23.37 KG/M2 | TEMPERATURE: 98 F | HEART RATE: 55 BPM | HEIGHT: 60 IN | WEIGHT: 119.06 LBS | RESPIRATION RATE: 18 BRPM | DIASTOLIC BLOOD PRESSURE: 74 MMHG | SYSTOLIC BLOOD PRESSURE: 137 MMHG

## 2024-05-08 DIAGNOSIS — L97.325 NON-PRESSURE CHRONIC ULCER OF LEFT ANKLE WITH MUSCLE INVOLVEMENT WITHOUT EVIDENCE OF NECROSIS (HCC): ICD-10-CM

## 2024-05-08 DIAGNOSIS — L97.325 NON-PRESSURE CHRONIC ULCER OF LEFT ANKLE WITH MUSCLE INVOLVEMENT WITHOUT EVIDENCE OF NECROSIS (HCC): Primary | ICD-10-CM

## 2024-05-08 DIAGNOSIS — I87.332 CHRONIC VENOUS HYPERTENSION WITH ULCER AND INFLAMMATION, LEFT (HCC): ICD-10-CM

## 2024-05-08 PROCEDURE — 99204 OFFICE O/P NEW MOD 45 MIN: CPT | Performed by: NURSE PRACTITIONER

## 2024-05-08 PROCEDURE — 73610 X-RAY EXAM OF ANKLE: CPT | Performed by: NURSE PRACTITIONER

## 2024-05-08 NOTE — PROGRESS NOTES
.Weekly Wound Education Note    Teaching Provided To: Patient;Family  Training Topics: Discharge instructions;Compression;Dressing;Edema control  Training Method: Explain/Verbal;Written  Training Response: Patient responds and understands;Reinforcement needed            Vein mapping scheduled for next month.  Xray ordered this visit.  Santyl ointment to wound, cover with xeroform and abd pad.  Calamine unna boot 30-40mmHg.

## 2024-05-08 NOTE — PATIENT INSTRUCTIONS
Please return:Friday for RN visit for wound assessment, edema management, and if applicable reapplication of multilayer compression bandage system.    1 week with Anay-please schedule every week for 4 weeks    Imaging & Consults:  XR ANKLE (MIN 3 VIEWS), LEFT (CPT=73610)    Patient discharge and wound care instructions  Raissa Pate  5/8/2024     You may shower with protection of the wound (ie a cast cover or similar).     Cleansing/dressing:       In clinic/home health care, with each dressing change:    please cleanse the limb, foot, and between toes with soap, water and washcloth.   Dry thoroughly.    Cleanse/soak the wound with VASHE (or other hypochlorous wound cleanser), dab dry.    Apply the following dressings:   small amount of santyl>xeroform (if at RN visit there is too much moisture substitute transfer for the xeroform)> 30-40mmhg calamine compression    Managing your edema:  Avoid prolonged standing in one place. It is better to have your calf muscles moving to pump fluid out of the legs      Elevate leg(s) above the level of the heart when sitting or as much as possible.  Take you diuretics as directed by your physician. Do not skip doses or change doses unless instructed to do so by your physician.  Decrease salt intake, follow recommended 2 grams daily.  Do not get leg(s) with compression wrap wet. If wraps are too tight as indicated by pain, numbness/tingling or discoloration of toes remove wrap completely and call the wound center @ [phone].  Refer to the \"Multi-layer compression bandage application patient information sheet\" given to you in clinic.     What Are Compression Wraps?   Compression wraps are elastic bandages that wrap around a part of your body to keep swelling down. The wraps create pressure which pushes extra fluid out of a certain area. This improves blood flow to that part of the body and helps it heal faster.   Compression wraps come in different styles. Your doctor  will recommend the best compression wrap for your needs.  How Do I Take Care of a Compression Wrap?   Compression wraps can be worn for up to seven days if you take good care of them. Here's how to make them last and keep them working right:   Keep them clean and dry until your next doctor's appointment.   Wear thin, stretchable stockings over the wrap to hold it in place. This keeps the wrap from sticking to your sheets while sleeping. It also keeps your clothing from sticking to the wrap.   Wear shoes that can fit comfortably around a wrap that covers your leg and foot.   Nutrition and blood sugar control:  Focus on the following:  Protein: Meats, beans, eggs, milk and yogurt particularly Greek yogurt), tofu, soy nuts, soy protein products (Follow the protein handout in your welcome folder)  Vitamin C: Citrus fruits and juices, strawberries, tomatoes, tomato juice, peppers, baked potatoes, spinach, broccoli, cauliflower, [place] sprouts, cabbage  Vitamin A: Dark green, leafy vegetables, orange or yellow vegetables, cantaloupe, fortified dairy products, liver, fortified cereals  Zinc: Fortified cereals, red meats, seafood  Consider supplementing with Dao by [employer]. It can be purchased on amazon, [employer] website, or local pharmacy may be able to order it for you.  (These are essential branch chain amino acids that help with tissue building and wound healing).     Concerns:  Signs of infection may include the following:  Increase in redness  Red \"streaks\" from wound  Increase in swelling  Fever  Unusual odor  Change in the amount of wound drainage     Should you experience any significant changes in your wound(s) or have any questions regarding your home care instructions please contact the [facility] @ [phone] If after regular business hours, please call your family doctor or local emergency room. The treatment plan has been discussed at length between you and your provider. Follow all  instructions carefully, it is very important. If you do not follow all instructions you are at risk of your wound not healing, infection, possible loss of limb and even loss of life.

## 2024-05-10 ENCOUNTER — TELEPHONE (OUTPATIENT)
Dept: CARDIOLOGY | Age: 84
End: 2024-05-10

## 2024-05-10 ENCOUNTER — OFFICE VISIT (OUTPATIENT)
Dept: WOUND CARE | Facility: HOSPITAL | Age: 84
End: 2024-05-10
Attending: NURSE PRACTITIONER
Payer: MEDICARE

## 2024-05-10 VITALS
RESPIRATION RATE: 14 BRPM | SYSTOLIC BLOOD PRESSURE: 138 MMHG | DIASTOLIC BLOOD PRESSURE: 72 MMHG | TEMPERATURE: 99 F | HEART RATE: 86 BPM

## 2024-05-10 DIAGNOSIS — I87.332 CHRONIC VENOUS HYPERTENSION WITH ULCER AND INFLAMMATION, LEFT (HCC): Primary | ICD-10-CM

## 2024-05-10 DIAGNOSIS — L97.325 NON-PRESSURE CHRONIC ULCER OF LEFT ANKLE WITH MUSCLE INVOLVEMENT WITHOUT EVIDENCE OF NECROSIS (HCC): ICD-10-CM

## 2024-05-10 PROCEDURE — 29581 APPL MULTLAYER CMPRN SYS LEG: CPT

## 2024-05-10 NOTE — PROGRESS NOTES
Chief Complaint   Patient presents with    Wound Care     Patient arrives for a wound care follow up appointment. Patient has an unna wrap to the left leg. Patient reports mild pain. Patient has an ABD pad and xeroform to the wound. Patient has no other concerns at this time.            Current Outpatient Medications:     losartan 25 MG Oral Tab, Take 40 mg by mouth daily., Disp: , Rfl:     Solifenacin Succinate 5 MG Oral Tab, Take 1 tablet (5 mg total) by mouth daily., Disp: , Rfl:     telmisartan 40 MG Oral Tab, Take 1 tablet (40 mg total) by mouth daily., Disp: , Rfl:     cephalexin 500 MG Oral Cap, Take 1 capsule (500 mg total) by mouth 2 (two) times daily. (Patient not taking: Reported on 5/8/2024), Disp: , Rfl:     naproxen 500 MG Oral Tab, Take 1 tablet (500 mg total) by mouth 2 (two) times daily as needed., Disp: 20 tablet, Rfl: 0    gabapentin 300 MG Oral Cap, Take 1 capsule (300 mg total) by mouth nightly., Disp: 30 capsule, Rfl: 0    metoprolol succinate 12.5 mg Oral Tab, Take 1 Partial Tablet (12.5 mg total) by mouth Daily Beta Blocker., Disp: , Rfl:     Levothyroxine Sodium 100 MCG Oral Tab, Take 1 tablet (100 mcg total) by mouth before breakfast., Disp: , Rfl:     atorvastatin 10 MG Oral Tab, Take 1 tablet (10 mg total) by mouth nightly., Disp: , Rfl:     Meclizine HCl 25 MG Oral Tab, Take 1 tablet (25 mg total) by mouth 3 (three) times daily as needed., Disp: 20 tablet, Rfl: 0    No Known Allergies       HISTORY:     Past medical, surgical, family and social history updated where appropriate.    PHYSICAL EXAM:   /72   Pulse 86   Temp 98.8 °F (37.1 °C)   Resp 14        Vital signs reviewed.      Calf  Point of Measurement - Left Calf: 31     Left Calf from:: Heel  Calf Left cm:: 34.5          Ankle  Point of Measurement - Left Ankle: 10     Left Ankle from:: Heel  Left Ankle cm:: 20.5                Wound 05/08/24 #1- Left Medial Ankle Ankle Left (Active)   Date First Assessed/Time First  Assessed: 05/08/24 1421    Wound Number (Wound Clinic Only): #1- Left Medial Ankle  Primary Wound Type: Venous Ulcer  Location: Ankle  Wound Location Orientation: Left      Assessments 5/8/2024  2:22 PM 5/10/2024  2:34 PM   Wound Image       Drainage Amount Moderate Small   Drainage Description Serous;Yellow Serosanguineous   Treatments Compression Compression   Wound Length (cm) 2 cm 1.6 cm   Wound Width (cm) 2.4 cm 1.9 cm   Wound Surface Area (cm^2) 4.8 cm^2 3.04 cm^2   Wound Depth (cm) 0.3 cm 0.2 cm   Wound Volume (cm^3) 1.44 cm^3 0.608 cm^3   Wound Healing % -- 58   Margins Well-defined edges Well-defined edges   Non-staged Wound Description Full thickness Full thickness   Meagan-wound Assessment Edema Edema;Blanchable erythema   Wound Granulation Tissue -- Red;Spongy   Wound Bed Granulation (%) -- 20 %   Wound Bed Slough (%) 100 % 80 %   Wound Odor None None   Tunneling? -- No   Undermining? -- No   Sinus Tracts? -- No       No associated orders.       Compression Wrap 05/08/24 Ankle Anterior;Left (Active)   Placement Date/Time: 05/08/24 1545   Location: Ankle  Wound Location Orientation: Anterior;Left      Assessments 5/8/2024  3:45 PM 5/10/2024  2:56 PM   Response to Treatment Well tolerated Well tolerated   Compression Layers Multilayer Multilayer   Compression Product Type Unna Boot Unna Boot   Dressing Applied Yes Yes   Compression Wrap Location Toes to Knee Toes to Knee   Compression Wrap Status Clean;Dry Clean;Dry;Intact       No associated orders.          ASSESSMENT AND PLAN:        Risks, benefits, and alternatives of current treatment plan discussed in detail.  Questions and concerns addressed. Red flags to RTC or ED reviewed.  Patient (or parent) agrees to plan.      No follow-ups on file.  Weekly Wound Education Note    Teaching Provided To: Patient  Training Topics: Cleasing and general instructions;Compression;Discharge instructions;Dressing;Edema control  Training Method: Explain/Verbal  Training  Response: Patient responds and understands;Reinforcement needed        Notes: Here for RN visit.    Here for RN visit, wound stable. Edema measurements stable, compression wrap tolerated well. Continue santyl, xeroform, BRIDGET pad, unna boot 30-40mmhg. Pt scheduled with provider 5/15/24.    Tiera PARSON RN   5/10/2024  2:57 PM

## 2024-05-14 ENCOUNTER — APPOINTMENT (OUTPATIENT)
Dept: GENERAL RADIOLOGY | Age: 84
End: 2024-05-14
Attending: INTERNAL MEDICINE

## 2024-05-14 ENCOUNTER — TELEPHONE (OUTPATIENT)
Dept: CARDIOLOGY | Age: 84
End: 2024-05-14

## 2024-05-14 ENCOUNTER — ANCILLARY PROCEDURE (OUTPATIENT)
Dept: GENERAL RADIOLOGY | Age: 84
End: 2024-05-14
Attending: INTERNAL MEDICINE

## 2024-05-14 ENCOUNTER — APPOINTMENT (OUTPATIENT)
Dept: CARDIOLOGY | Age: 84
End: 2024-05-14
Attending: INTERNAL MEDICINE

## 2024-05-14 DIAGNOSIS — R94.31 ABNORMAL ELECTROCARDIOGRAM (ECG) (EKG): ICD-10-CM

## 2024-05-14 DIAGNOSIS — I49.3 FREQUENT PVCS: ICD-10-CM

## 2024-05-14 DIAGNOSIS — I10 ESSENTIAL HYPERTENSION: ICD-10-CM

## 2024-05-14 DIAGNOSIS — I83.812 VARICOSE VEINS OF LEFT LOWER EXTREMITY WITH PAIN: ICD-10-CM

## 2024-05-14 DIAGNOSIS — E78.2 MIXED HYPERLIPIDEMIA: ICD-10-CM

## 2024-05-14 DIAGNOSIS — E03.9 HYPOTHYROIDISM, UNSPECIFIED TYPE: ICD-10-CM

## 2024-05-14 PROCEDURE — 78452 HT MUSCLE IMAGE SPECT MULT: CPT | Performed by: INTERNAL MEDICINE

## 2024-05-14 PROCEDURE — 93015 CV STRESS TEST SUPVJ I&R: CPT | Performed by: INTERNAL MEDICINE

## 2024-05-14 PROCEDURE — A9500 TC99M SESTAMIBI: HCPCS | Performed by: INTERNAL MEDICINE

## 2024-05-14 RX ORDER — TETRAKIS(2-METHOXYISOBUTYLISOCYANIDE)COPPER(I) TETRAFLUOROBORATE 1 MG/ML
30 INJECTION, POWDER, LYOPHILIZED, FOR SOLUTION INTRAVENOUS ONCE
Status: COMPLETED | OUTPATIENT
Start: 2024-05-14 | End: 2024-05-14

## 2024-05-14 RX ORDER — TETRAKIS(2-METHOXYISOBUTYLISOCYANIDE)COPPER(I) TETRAFLUOROBORATE 1 MG/ML
10 INJECTION, POWDER, LYOPHILIZED, FOR SOLUTION INTRAVENOUS ONCE
Status: COMPLETED | OUTPATIENT
Start: 2024-05-14 | End: 2024-05-14

## 2024-05-14 RX ADMIN — TETRAKIS(2-METHOXYISOBUTYLISOCYANIDE)COPPER(I) TETRAFLUOROBORATE 33 MILLICURIE: 1 INJECTION, POWDER, LYOPHILIZED, FOR SOLUTION INTRAVENOUS at 10:55

## 2024-05-14 RX ADMIN — TETRAKIS(2-METHOXYISOBUTYLISOCYANIDE)COPPER(I) TETRAFLUOROBORATE 10.4 MILLICURIE: 1 INJECTION, POWDER, LYOPHILIZED, FOR SOLUTION INTRAVENOUS at 09:30

## 2024-05-14 NOTE — PROGRESS NOTES
CHIEF COMPLAINT:     Chief Complaint   Patient presents with    Wound Care     Patient arrives for a wound care follow up appointment. Patient reports moderate pain. There is an unna wrap to the left leg.      HPI:   Information obtained from patient and chart  5-8-24 INITIAL  82 yo with pmhx of htn, high cholesterol, venous insufficiency with ulceration.  She presented to primary with left foot ulceration, patient was started on Keflex, which patient completed today.  Patient is here today with her son. She has been utilizing neosporin to the wound.  Her recent esr was wnl. Son reports that patient had ulcerations many years ago, and then when she went to [facility] for many years they healed.  She returned here to the [facility] and is living with her son and has redeveloped the ulceration. He states she sleeps in a bed, but spends a lot of time in the kitchen at the counter even though they advise her to sit and elevate her legs.  She has scarring and hypopigmentation noted on the left leg and venous changes bilaterally.  They have seen a Dr. Sinclair (son is not sure what organization md is with) and they will be doing a vein mapping at the end of may.  We discussed that the ulceration is directly over the malleloous. Will get xray. Will start compression and advanced wound dressings. Today there is not any s/s of infection. Patient does have pain with movement and touch to the area. But no visible movement in the wound bed.    5-15-24 patient returns, she did get xray done, patient with mild soft tissue swelling in hindfoot and small heel spurs noted. Patient came  for rn visit late last week. Edema not significantly reduced, wound is improved and I was able to debride and get it clean enough to transition to collagen. We also discussed compression pumps and they are interested, will start that process. No s/s of infection.      MEDICATIONS:     Current Outpatient Medications:     losartan 25 MG Oral Tab, Take 40 mg by mouth  daily., Disp: , Rfl:     Solifenacin Succinate 5 MG Oral Tab, Take 1 tablet (5 mg total) by mouth daily., Disp: , Rfl:     telmisartan 40 MG Oral Tab, Take 1 tablet (40 mg total) by mouth daily., Disp: , Rfl:     cephalexin 500 MG Oral Cap, Take 1 capsule (500 mg total) by mouth 2 (two) times daily. (Patient not taking: Reported on 5/8/2024), Disp: , Rfl:     naproxen 500 MG Oral Tab, Take 1 tablet (500 mg total) by mouth 2 (two) times daily as needed., Disp: 20 tablet, Rfl: 0    gabapentin 300 MG Oral Cap, Take 1 capsule (300 mg total) by mouth nightly., Disp: 30 capsule, Rfl: 0    metoprolol succinate 12.5 mg Oral Tab, Take 1 Partial Tablet (12.5 mg total) by mouth Daily Beta Blocker., Disp: , Rfl:     Levothyroxine Sodium 100 MCG Oral Tab, Take 1 tablet (100 mcg total) by mouth before breakfast., Disp: , Rfl:     atorvastatin 10 MG Oral Tab, Take 1 tablet (10 mg total) by mouth nightly., Disp: , Rfl:     Meclizine HCl 25 MG Oral Tab, Take 1 tablet (25 mg total) by mouth 3 (three) times daily as needed., Disp: 20 tablet, Rfl: 0  ALLERGIES:   No Known Allergies   REVIEW OF SYSTEMS:   This information was obtained from the patient/family and chart.    See HPI for pertinent positives, otherwise 10 pt ROS negative.  HISTORY:   Past medical, surgical, family and social history updated where appropriate.    PHYSICAL EXAM:     Vitals:    05/15/24 0700   BP: 141/73   Pulse: 88   Resp: 16   Temp: 98.1 °F (36.7 °C)        Estimated body mass index is 23.25 kg/m² as calculated from the following:    Height as of 5/8/24: 60\".    Weight as of 5/8/24: 119 lb 0.8 oz (54 kg).   POC Glucose   Date Value Ref Range Status   12/08/2017 128 (H) 65 - 99 mg/dL Final       Vital signs reviewed.Appears stated age, well groomed.    Constitutional:  Bp wnl for patient. Pulse Regular and wnl for patient. Respirations easy and unlabored. Temperature wnl. Elevated bmi. Appearance neat and clean. Appears in no acute distress. Well nourished and  well developed.    Lower extremity:  dp/pt palpable left. Left  lower extremity + for varicosities, +edema with slight reduction, +hyperpigmentation and hypopigmentation on the left, scarring noted on the left. Capillary refill < 3 seconds. Digits are warm. toenails are wnl for color, thickness and hygeine. Skin hydration wnl. no hairgrowth on legs.    Musculoskeletal:  Gait and station stable   Integumentary:  refer to wound characteristics and images   Psychiatric:  Judgment and insight intact. Alert and oriented times 3. No evidence of depression, anxiety, or agitation. Calm, cooperative, and communicative.   EDEMA:   Calf  Point of Measurement - Left Calf: 31     Left Calf from:: Heel  Calf Left cm:: 34.5        Ankle  Point of Measurement - Left Ankle: 10     Left Ankle from:: Heel  Left Ankle cm:: 19.8              DIAGNOSTICS:     Lab Results   Component Value Date    BUN 24 (H) 05/02/2024    CREATSERUM 1.12 (H) 05/02/2024    ALB 3.5 05/02/2024    TP 8.1 05/02/2024 5-8-24 left ankle xray  CONCLUSION:    Overlying bandage material obscures fine osseous detail.    No appreciable fracture or traumatic malalignment.  The ankle mortise and joint spaces of the hindfoot are preserved.  Small Achilles and plantar calcaneal spurs noted.  Mild soft tissue swelling diffusely about the hindfoot.  No focal areas of   ulceration identified.     WOUND ASSESSMENT:     Wound 05/08/24 #1- Left Medial Ankle Ankle Left (Active)   Date First Assessed/Time First Assessed: 05/08/24 1421    Wound Number (Wound Clinic Only): #1- Left Medial Ankle  Primary Wound Type: Venous Ulcer  Location: Ankle  Wound Location Orientation: Left      Assessments 5/8/2024  2:22 PM 5/15/2024  9:00 AM   Wound Image        Drainage Amount Moderate Moderate   Drainage Description Serous;Yellow Yellow;Serous   Treatments Compression --   Wound Length (cm) 2 cm 1.4 cm   Wound Width (cm) 2.4 cm 1.8 cm   Wound Surface Area (cm^2) 4.8 cm^2 2.52 cm^2   Wound  Depth (cm) 0.3 cm 0.2 cm   Wound Volume (cm^3) 1.44 cm^3 0.504 cm^3   Wound Healing % -- 65   Margins Well-defined edges Well-defined edges   Non-staged Wound Description Full thickness Full thickness   Meagan-wound Assessment Edema Edema;Hemosiderin staining;Moist   Wound Granulation Tissue -- Pink;Spongy   Wound Bed Granulation (%) -- 10 %   Wound Bed Slough (%) 100 % 90 %   Wound Odor None None   Tunneling? -- No   Undermining? -- No   Sinus Tracts? -- No       Active Orders   Date Order Priority Status Authorizing Provider   05/15/24 0925 Debridement Venous Ulcer Left Ankle Routine Active Anay Finn APRN       Compression Wrap 05/08/24 Ankle Anterior;Left (Active)   Placement Date/Time: 05/08/24 1545   Location: Ankle  Wound Location Orientation: Anterior;Left      Assessments 5/8/2024  3:45 PM 5/10/2024  2:56 PM   Response to Treatment Well tolerated Well tolerated   Compression Layers Multilayer Multilayer   Compression Product Type Unna Boot Unna Boot   Dressing Applied Yes Yes   Compression Wrap Location Toes to Knee Toes to Knee   Compression Wrap Status Clean;Dry Clean;Dry;Intact       No associated orders.      PROCEDURE:      This procedure was medically necessary to promote wound healing by removing nonviable tissue, decrease chance of infection, and return the wound to an acute state.  See rn px note    ASSESSMENT AND PLAN:    1. Chronic venous hypertension with ulcer and inflammation, left (HCC)    2. 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  Questions and concerns addressed. Red flags to RTC or ED reviewed.  Patient (or parent) agrees to plan.      NOTE TO PATIENT: The 21st Century Cures Act makes clinical notes like these available to patients in the interest of transparency. Clinical notes are medical documents used by physicians and care providers to  communicate with each other. These documents include medical language and terminology, abbreviations, and treatment information that may sound technical and at times possibly unfamiliar. In addition, at times, the verbiage may appear blunt or direct. These documents are one tool providers use to communicate relevant information and clinical opinions of the care providers in a way that allows common understanding of the clinical context.    I spent 35 minutes with the patient. This time included:    preparing to see the patient (eg, review notes and recent diagnostics),  seeing the patient, obtaining and/or reviewing separately obtained history, performing a medically appropriate examination and/or evaluation, counseling and educating the patient, documenting in the record bill selective debridement only.  DISCHARGE:      Patient Instructions   Please return:    1 week with Anay    Patient discharge and wound care instructions  Raissa Pate  5/15/2024      You may shower with protection of the wound (ie a cast cover or similar).     Cleansing/dressing:       In clinic/home health care, with each dressing change:    please cleanse the limb, foot, and between toes with soap, water and washcloth.   Dry thoroughly.    Cleanse/soak the wound with VASHE (or other hypochlorous wound cleanser), dab dry.    Apply the following dressings:   betamethasone to periwound>sheila>xeroform> 30-40mmhg calamine compression    Managing your edema:  Avoid prolonged standing in one place. It is better to have your calf muscles moving to pump fluid out of the legs      Elevate leg(s) above the level of the heart when sitting or as much as possible.  Take you diuretics as directed by your physician. Do not skip doses or change doses unless instructed to do so by your physician.  Decrease salt intake, follow recommended 2 grams daily.  Do not get leg(s) with compression wrap wet. If wraps are too tight as indicated by pain,  numbness/tingling or discoloration of toes remove wrap completely and call the wound center @ [phone].  Refer to the \"Multi-layer compression bandage application patient information sheet\" given to you in clinic.     What Are Compression Wraps?   Compression wraps are elastic bandages that wrap around a part of your body to keep swelling down. The wraps create pressure which pushes extra fluid out of a certain area. This improves blood flow to that part of the body and helps it heal faster.   Compression wraps come in different styles. Your doctor will recommend the best compression wrap for your needs.  How Do I Take Care of a Compression Wrap?   Compression wraps can be worn for up to seven days if you take good care of them. Here's how to make them last and keep them working right:   Keep them clean and dry until your next doctor's appointment.   Wear thin, stretchable stockings over the wrap to hold it in place. This keeps the wrap from sticking to your sheets while sleeping. It also keeps your clothing from sticking to the wrap.   Wear shoes that can fit comfortably around a wrap that covers your leg and foot.       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 Patient has been compliant with 30-40 mmHg compression bandaging, elevation, and exercise over the past 4 weeks, but still presents with significant swelling and hyperpigmentation and ulceration.      Nutrition and blood sugar control:  Focus on the following:  Protein: Meats, beans, eggs, milk and yogurt particularly Greek yogurt), tofu, soy nuts, soy protein products (Follow the protein handout in your welcome folder)  Vitamin C: Citrus fruits and juices, strawberries, tomatoes, tomato juice, peppers, baked potatoes, spinach, broccoli, cauliflower, [place] sprouts, cabbage  Vitamin A: Dark green, leafy vegetables, orange or yellow vegetables, cantaloupe, fortified dairy  products, liver, fortified cereals  Zinc: Fortified cereals, red meats, seafood  Consider supplementing with Dao by [employer]. It can be purchased on amazon, [employer] website, or local pharmacy may be able to order it for you.  (These are essential branch chain amino acids that help with tissue building and wound healing).     Concerns:  Signs of infection may include the following:  Increase in redness  Red \"streaks\" from wound  Increase in swelling  Fever  Unusual odor  Change in the amount of wound drainage     Should you experience any significant changes in your wound(s) or have any questions regarding your home care instructions please contact the wound center [facility] @ [phone] If after regular business hours, please call your family doctor or local emergency room. The treatment plan has been discussed at length between you and your provider. Follow all instructions carefully, it is very important. If you do not follow all instructions you are at risk of your wound not healing, infection, possible loss of limb and even loss of life.    Anay Finn FNP-C, CWCN-AP, CFCN, CSWS, WCC, DWC  5/15/2024

## 2024-05-15 ENCOUNTER — OFFICE VISIT (OUTPATIENT)
Dept: WOUND CARE | Facility: HOSPITAL | Age: 84
End: 2024-05-15
Attending: NURSE PRACTITIONER
Payer: MEDICARE

## 2024-05-15 VITALS
TEMPERATURE: 98 F | DIASTOLIC BLOOD PRESSURE: 73 MMHG | SYSTOLIC BLOOD PRESSURE: 141 MMHG | RESPIRATION RATE: 16 BRPM | HEART RATE: 88 BPM

## 2024-05-15 DIAGNOSIS — I87.332 CHRONIC VENOUS HYPERTENSION WITH ULCER AND INFLAMMATION, LEFT (HCC): Primary | ICD-10-CM

## 2024-05-15 DIAGNOSIS — L97.325 NON-PRESSURE CHRONIC ULCER OF LEFT ANKLE WITH MUSCLE INVOLVEMENT WITHOUT EVIDENCE OF NECROSIS (HCC): ICD-10-CM

## 2024-05-15 PROCEDURE — 97597 DBRDMT OPN WND 1ST 20 CM/<: CPT | Performed by: NURSE PRACTITIONER

## 2024-05-15 NOTE — PROGRESS NOTES
Patient ID: Raissa Pate is a 83 year old female.    Debridement Venous Ulcer Left Ankle   Wound 05/08/24 #1- Left Medial Ankle Ankle Left    Performed by: Anay Finn APRN  Authorized by: Anay Finn APRN      Consent   Consent obtained? verbal  Consent given by: patient    Debridement Details  Performed by: NP  Debridement type: conservative sharp  Pain control: lidocaine 4%  Pain control administration type: topical    Pre-debridement measurements  Length (cm): 1.4  Width (cm): 1.8  Depth (cm): 0.2  Surface Area (cm^2): 2.52    Post-debridement measurements  Length (cm): 1.4  Width (cm): 1.8  Depth (cm): 0.3  Percent debrided: 100%  Surface Area (cm^2): 2.52  Area Debrided (cm^2): 2.52  Volume (cm^3): 0.76    Devitalized tissue debrided: biofilm and slough  Instrument(s) utilized: curette  Bleeding: small  Hemostasis obtained with: pressure  Procedural pain (0-10): 4  Post-procedural pain: 2   Response to treatment: procedure was tolerated well

## 2024-05-15 NOTE — PROGRESS NOTES
.Weekly Wound Education Note    Teaching Provided To: Patient;Family  Training Topics: Discharge instructions;Dressing;Compression;Edema control  Training Method: Explain/Verbal;Written  Training Response: Reinforcement needed            Betamethasone, Aracelis Ag, xeroform, abd pad.  Calamine unna boot 30-40mmHg.

## 2024-05-15 NOTE — PATIENT INSTRUCTIONS
Please return:    1 week with Anay    Patient discharge and wound care instructions  Raissa Pate  5/15/2024      You may shower with protection of the wound (ie a cast cover or similar).     Cleansing/dressing:       In clinic/home health care, with each dressing change:    please cleanse the limb, foot, and between toes with soap, water and washcloth.   Dry thoroughly.    Cleanse/soak the wound with VASHE (or other hypochlorous wound cleanser), dab dry.    Apply the following dressings:   betamethasone to periwound>sheila>xeroform> 30-40mmhg calamine compression    Managing your edema:  Avoid prolonged standing in one place. It is better to have your calf muscles moving to pump fluid out of the legs      Elevate leg(s) above the level of the heart when sitting or as much as possible.  Take you diuretics as directed by your physician. Do not skip doses or change doses unless instructed to do so by your physician.  Decrease salt intake, follow recommended 2 grams daily.  Do not get leg(s) with compression wrap wet. If wraps are too tight as indicated by pain, numbness/tingling or discoloration of toes remove wrap completely and call the wound center @ [phone].  Refer to the \"Multi-layer compression bandage application patient information sheet\" given to you in clinic.     What Are Compression Wraps?   Compression wraps are elastic bandages that wrap around a part of your body to keep swelling down. The wraps create pressure which pushes extra fluid out of a certain area. This improves blood flow to that part of the body and helps it heal faster.   Compression wraps come in different styles. Your doctor will recommend the best compression wrap for your needs.  How Do I Take Care of a Compression Wrap?   Compression wraps can be worn for up to seven days if you take good care of them. Here's how to make them last and keep them working right:   Keep them clean and dry until your next doctor's appointment.    Wear thin, stretchable stockings over the wrap to hold it in place. This keeps the wrap from sticking to your sheets while sleeping. It also keeps your clothing from sticking to the wrap.   Wear shoes that can fit comfortably around a wrap that covers your leg and foot.       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 Patient has been compliant with 30-40 mmHg compression bandaging, elevation, and exercise over the past 4 weeks, but still presents with significant swelling and hyperpigmentation and ulceration.      Nutrition and blood sugar control:  Focus on the following:  Protein: Meats, beans, eggs, milk and yogurt particularly Greek yogurt), tofu, soy nuts, soy protein products (Follow the protein handout in your welcome folder)  Vitamin C: Citrus fruits and juices, strawberries, tomatoes, tomato juice, peppers, baked potatoes, spinach, broccoli, cauliflower, [place] sprouts, cabbage  Vitamin A: Dark green, leafy vegetables, orange or yellow vegetables, cantaloupe, fortified dairy products, liver, fortified cereals  Zinc: Fortified cereals, red meats, seafood  Consider supplementing with Dao by [employer]. It can be purchased on amazon, [employer] website, or local pharmacy may be able to order it for you.  (These are essential branch chain amino acids that help with tissue building and wound healing).     Concerns:  Signs of infection may include the following:  Increase in redness  Red \"streaks\" from wound  Increase in swelling  Fever  Unusual odor  Change in the amount of wound drainage     Should you experience any significant changes in your wound(s) or have any questions regarding your home care instructions please contact the wound center [facility] @ [phone] If after regular business hours, please call your family doctor or local emergency room. The treatment plan has been discussed at length between you and your  provider. Follow all instructions carefully, it is very important. If you do not follow all instructions you are at risk of your wound not healing, infection, possible loss of limb and even loss of life.

## 2024-05-17 ENCOUNTER — APPOINTMENT (OUTPATIENT)
Dept: CARDIOLOGY | Age: 84
End: 2024-05-17
Attending: INTERNAL MEDICINE

## 2024-05-17 DIAGNOSIS — I83.812 VARICOSE VEINS OF LEFT LOWER EXTREMITY WITH PAIN: ICD-10-CM

## 2024-05-17 DIAGNOSIS — E78.2 MIXED HYPERLIPIDEMIA: ICD-10-CM

## 2024-05-17 DIAGNOSIS — I49.3 FREQUENT PVCS: ICD-10-CM

## 2024-05-17 DIAGNOSIS — I10 ESSENTIAL HYPERTENSION: ICD-10-CM

## 2024-05-17 DIAGNOSIS — E03.9 HYPOTHYROIDISM, UNSPECIFIED TYPE: ICD-10-CM

## 2024-05-17 LAB
AORTIC VALVE AREA (AVA): 1.03
ASCENDING AORTA (AAD): 8
AV MEAN GRADIENT (AVMG): 4.9
AV MEAN VELOCITY (AVMV): 1.04
AV PEAK GRADIENT (AVPG): 9.6
AV PEAK VELOCITY (AVPV): 1.55
AV STENOSIS SEVERITY TEXT: NORMAL
AVI LVOT PEAK GRADIENT (LVOTMG): 0.9
E WAVE DECELARATION TIME (MDT): 9.9
INTERVENTRICULAR SEPTUM IN END DIASTOLE (IVSD): 2.88
LEFT INTERNAL DIMENSION IN SYSTOLE (LVSD): 0.9
LEFT VENTRICULAR INTERNAL DIMENSION IN DIASTOLE (LVDD): 2.6
LEFT VENTRICULAR POSTERIOR WALL IN END DIASTOLE (LVPW): 4
LV EF: NORMAL %
MV E TISSUE VEL MED (MESV): 12.4
MV E WAVE VEL/E TISSUE VEL MED(MSR): 10.4
MV PEAK A VELOCITY (MVPAV): 137
MV PEAK E VELOCITY (MVPEV): 1.06
RV END SYSTOLIC LONGITUDINAL STRAIN FREE WALL (RVGS): 1.9
TV ESTIMATED RIGHT ARTERIAL PRESSURE (RAP): 10.6

## 2024-05-17 PROCEDURE — 93306 TTE W/DOPPLER COMPLETE: CPT | Performed by: INTERNAL MEDICINE

## 2024-05-20 ENCOUNTER — TELEPHONE (OUTPATIENT)
Dept: CARDIOLOGY | Age: 84
End: 2024-05-20

## 2024-05-21 ENCOUNTER — APPOINTMENT (OUTPATIENT)
Dept: CARDIOLOGY | Age: 84
End: 2024-05-21
Attending: INTERNAL MEDICINE

## 2024-05-21 DIAGNOSIS — I49.3 FREQUENT PVCS: ICD-10-CM

## 2024-05-21 DIAGNOSIS — R94.31 ABNORMAL ELECTROCARDIOGRAM (ECG) (EKG): ICD-10-CM

## 2024-05-21 DIAGNOSIS — I83.812 VARICOSE VEINS OF LEFT LOWER EXTREMITY WITH PAIN: ICD-10-CM

## 2024-05-21 DIAGNOSIS — E03.9 HYPOTHYROIDISM, UNSPECIFIED TYPE: ICD-10-CM

## 2024-05-21 DIAGNOSIS — I10 ESSENTIAL HYPERTENSION: ICD-10-CM

## 2024-05-21 DIAGNOSIS — E78.2 MIXED HYPERLIPIDEMIA: ICD-10-CM

## 2024-05-21 PROCEDURE — 93970 EXTREMITY STUDY: CPT | Performed by: SURGERY

## 2024-05-21 NOTE — PROGRESS NOTES
CHIEF COMPLAINT:     Chief Complaint   Patient presents with    Wound Care     Patient is here for a wound care follow up. She does not currently have any pain, but did have some \"terrible pain\" last night. She also complains of itching to the gayatri-wound.     HPI:   Information obtained from patient and chart  5-8-24 INITIAL  84 yo with pmhx of htn, high cholesterol, venous insufficiency with ulceration.  She presented to primary with left foot ulceration, patient was started on Keflex, which patient completed today.  Patient is here today with her son. She has been utilizing neosporin to the wound.  Her recent esr was wnl. Son reports that patient had ulcerations many years ago, and then when she went to [facility] for many years they healed.  She returned here to the [facility] and is living with her son and has redeveloped the ulceration. He states she sleeps in a bed, but spends a lot of time in the kitchen at the counter even though they advise her to sit and elevate her legs.  She has scarring and hypopigmentation noted on the left leg and venous changes bilaterally.  They have seen a Dr. Sinclair (son is not sure what organization md is with) and they will be doing a vein mapping at the end of may.  We discussed that the ulceration is directly over the malleloous. Will get xray. Will start compression and advanced wound dressings. Today there is not any s/s of infection. Patient does have pain with movement and touch to the area. But no visible movement in the wound bed.    5-15-24 patient returns, she did get xray done, patient with mild soft tissue swelling in hindfoot and small heel spurs noted. Patient came  for rn visit late last week. Edema not significantly reduced, wound is improved and I was able to debride and get it clean enough to transition to collagen. We also discussed compression pumps and they are interested, will start that process. No s/s of infection.    5-22-24 patient returns, last week we debrided  the wound and transitioned to collagen.  They trialed the pumps yesterday and patient liked them. Patient also had her vein mapping done yesterday, they don't have results yet. Their appointment with dr. Sinclair is not until end of June, I encouraged them to maybe move that appointment sooner if able.  Today, the wound is slightly smaller and more granular. Patient c/o pain and itching with manipulation of the area and itching at noc. No acute s/s of infection. Will run insurance for [employer] [facility].      MEDICATIONS:     Current Outpatient Medications:     losartan 25 MG Oral Tab, Take 40 mg by mouth daily., Disp: , Rfl:     Solifenacin Succinate 5 MG Oral Tab, Take 1 tablet (5 mg total) by mouth daily., Disp: , Rfl:     telmisartan 40 MG Oral Tab, Take 1 tablet (40 mg total) by mouth daily., Disp: , Rfl:     cephalexin 500 MG Oral Cap, Take 1 capsule (500 mg total) by mouth 2 (two) times daily. (Patient not taking: Reported on 5/8/2024), Disp: , Rfl:     naproxen 500 MG Oral Tab, Take 1 tablet (500 mg total) by mouth 2 (two) times daily as needed., Disp: 20 tablet, Rfl: 0    gabapentin 300 MG Oral Cap, Take 1 capsule (300 mg total) by mouth nightly., Disp: 30 capsule, Rfl: 0    metoprolol succinate 12.5 mg Oral Tab, Take 1 Partial Tablet (12.5 mg total) by mouth Daily Beta Blocker., Disp: , Rfl:     Levothyroxine Sodium 100 MCG Oral Tab, Take 1 tablet (100 mcg total) by mouth before breakfast., Disp: , Rfl:     atorvastatin 10 MG Oral Tab, Take 1 tablet (10 mg total) by mouth nightly., Disp: , Rfl:     Meclizine HCl 25 MG Oral Tab, Take 1 tablet (25 mg total) by mouth 3 (three) times daily as needed., Disp: 20 tablet, Rfl: 0  ALLERGIES:   No Known Allergies   REVIEW OF SYSTEMS:   This information was obtained from the patient/family and chart.    See HPI for pertinent positives, otherwise 10 pt ROS negative.  HISTORY:   Past medical, surgical, family and social history updated where appropriate.    PHYSICAL EXAM:     Vitals:     05/22/24 0907   BP: 157/80   Pulse: 75   Resp: 16   Temp: 97.3 °F (36.3 °C)          Estimated body mass index is 23.25 kg/m² as calculated from the following:    Height as of 5/8/24: 60\".    Weight as of 5/8/24: 119 lb 0.8 oz (54 kg).   POC Glucose   Date Value Ref Range Status   12/08/2017 128 (H) 65 - 99 mg/dL Final       Vital signs reviewed.Appears stated age, well groomed.    Constitutional:  Bp wnl for patient. Pulse Regular and wnl for patient. Respirations easy and unlabored. Temperature wnl. Elevated bmi. Appearance neat and clean. Appears in no acute distress. Well nourished and well developed.    Lower extremity:  dp/pt palpable left. Left  lower extremity + for varicosities, +edema stable, +hyperpigmentation and hypopigmentation on the left, scarring noted on the left. Capillary refill < 3 seconds. Digits are warm. toenails are wnl for color, thickness and hygeine. Skin hydration wnl. no hairgrowth on legs.    Musculoskeletal:  Gait and station stable   Integumentary:  refer to wound characteristics and images   Psychiatric:  Judgment and insight intact. Alert and oriented times 3. No evidence of depression, anxiety, or agitation. Calm, cooperative, and communicative.   EDEMA:   Calf  Point of Measurement - Left Calf: 31     Left Calf from:: Heel  Calf Left cm:: 34        Ankle  Point of Measurement - Left Ankle: 10     Left Ankle from:: Heel  Left Ankle cm:: 19.7              DIAGNOSTICS:     Lab Results   Component Value Date    BUN 24 (H) 05/02/2024    CREATSERUM 1.12 (H) 05/02/2024    ALB 3.5 05/02/2024    TP 8.1 05/02/2024 5-8-24 left ankle xray  CONCLUSION:    Overlying bandage material obscures fine osseous detail.    No appreciable fracture or traumatic malalignment.  The ankle mortise and joint spaces of the hindfoot are preserved.  Small Achilles and plantar calcaneal spurs noted.  Mild soft tissue swelling diffusely about the hindfoot.  No focal areas of   ulceration identified.      WOUND ASSESSMENT:     Wound 05/08/24 #1- Left Medial Ankle Ankle Left (Active)   Date First Assessed/Time First Assessed: 05/08/24 1421    Wound Number (Wound Clinic Only): #1- Left Medial Ankle  Primary Wound Type: Venous Ulcer  Location: Ankle  Wound Location Orientation: Left      Assessments 5/8/2024  2:22 PM 5/22/2024  9:09 AM   Wound Image       Drainage Amount Moderate Large   Drainage Description Serous;Yellow Serosanguineous   Treatments Compression --   Wound Length (cm) 2 cm 1.4 cm   Wound Width (cm) 2.4 cm 1.6 cm   Wound Surface Area (cm^2) 4.8 cm^2 2.24 cm^2   Wound Depth (cm) 0.3 cm 0.1 cm   Wound Volume (cm^3) 1.44 cm^3 0.224 cm^3   Wound Healing % -- 84   Margins Well-defined edges Well-defined edges   Non-staged Wound Description Full thickness Full thickness   Meagan-wound Assessment Edema Edema;Hemosiderin staining   Wound Granulation Tissue -- Spongy;Pink   Wound Bed Granulation (%) -- 40 %   Wound Bed Slough (%) 100 % 60 %   Wound Odor None None   Tunneling? -- No   Undermining? -- No   Sinus Tracts? -- No       Inactive Orders   Date Order Priority Status Authorizing Provider   05/15/24 0925 Debridement Venous Ulcer Left Ankle Routine Completed Anay Finn APRN       Compression Wrap 05/08/24 Ankle Anterior;Left (Active)   Placement Date/Time: 05/08/24 1545   Location: Ankle  Wound Location Orientation: Anterior;Left      Assessments 5/8/2024  3:45 PM 5/15/2024 10:51 AM   Response to Treatment Well tolerated Well tolerated   Compression Layers Multilayer Multilayer   Compression Product Type Unna Boot Unna Boot   Dressing Applied Yes Yes   Compression Wrap Location Toes to Knee Toes to Knee   Compression Wrap Status Clean;Dry Clean;Dry       No associated orders.      Coagulum wiped out of wound bed with dry gauze to reveal 80% red granulation buds.    ASSESSMENT AND PLAN:    1. Chronic venous hypertension with ulcer and inflammation, left (HCC)    2. 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  Questions and concerns addressed. Red flags to RTC or ED reviewed.  Patient (or parent) agrees to plan.      NOTE TO PATIENT: The 21st Century Cures Act makes clinical notes like these available to patients in the interest of transparency. Clinical notes are medical documents used by physicians and care providers to communicate with each other. These documents include medical language and terminology, abbreviations, and treatment information that may sound technical and at times possibly unfamiliar. In addition, at times, the verbiage may appear blunt or direct. These documents are one tool providers use to communicate relevant information and clinical opinions of the care providers in a way that allows common understanding of the clinical context.    I spent 30 minutes with the patient. This time included:    preparing to see the patient (eg, review notes and recent diagnostics),  seeing the patient, obtaining and/or reviewing separately obtained history, performing a medically appropriate examination and/or evaluation, counseling and educating the patient, documenting in the record   DISCHARGE:      Patient Instructions   Please return:  1 week with Anay    Patient discharge and wound care instructions  Raissa Victoriakeylasobia  5/22/2024         You may shower with protection of the wound (ie a cast cover or similar).     Cleansing/dressing:       In clinic/home health care, with each dressing change:    please cleanse the limb, foot, and between toes with soap, water and washcloth.   Dry thoroughly.    Cleanse/soak the wound with VASHE (or other hypochlorous wound cleanser), dab dry.    Apply the following dressings:   betamethasone to periwound>sheila>xeroform> 30-40mmhg calamine compression    Managing your edema:  Avoid prolonged standing in one place. It is  better to have your calf muscles moving to pump fluid out of the legs      Elevate leg(s) above the level of the heart when sitting or as much as possible.  Take you diuretics as directed by your physician. Do not skip doses or change doses unless instructed to do so by your physician.  Decrease salt intake, follow recommended 2 grams daily.  Do not get leg(s) with compression wrap wet. If wraps are too tight as indicated by pain, numbness/tingling or discoloration of toes remove wrap completely and call the wound center @ [phone].  Refer to the \"Multi-layer compression bandage application patient information sheet\" given to you in clinic.     What Are Compression Wraps?   Compression wraps are elastic bandages that wrap around a part of your body to keep swelling down. The wraps create pressure which pushes extra fluid out of a certain area. This improves blood flow to that part of the body and helps it heal faster.   Compression wraps come in different styles. Your doctor will recommend the best compression wrap for your needs.  How Do I Take Care of a Compression Wrap?   Compression wraps can be worn for up to seven days if you take good care of them. Here's how to make them last and keep them working right:   Keep them clean and dry until your next doctor's appointment.   Wear thin, stretchable stockings over the wrap to hold it in place. This keeps the wrap from sticking to your sheets while sleeping. It also keeps your clothing from sticking to the wrap.   Wear shoes that can fit comfortably around a wrap that covers your leg and foot.       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 Patient has been compliant with 30-40 mmHg compression bandaging, elevation, and exercise over the past 4 weeks, but still presents with significant swelling and hyperpigmentation and ulceration.      Nutrition and blood sugar control:  Focus on the  following:  Protein: Meats, beans, eggs, milk and yogurt particularly Greek yogurt), tofu, soy nuts, soy protein products (Follow the protein handout in your welcome folder)  Vitamin C: Citrus fruits and juices, strawberries, tomatoes, tomato juice, peppers, baked potatoes, spinach, broccoli, cauliflower, [place] sprouts, cabbage  Vitamin A: Dark green, leafy vegetables, orange or yellow vegetables, cantaloupe, fortified dairy products, liver, fortified cereals  Zinc: Fortified cereals, red meats, seafood  Consider supplementing with Dao by [employer]. It can be purchased on amazon, [employer] website, or local pharmacy may be able to order it for you.  (These are essential branch chain amino acids that help with tissue building and wound healing).     Concerns:  Signs of infection may include the following:  Increase in redness  Red \"streaks\" from wound  Increase in swelling  Fever  Unusual odor  Change in the amount of wound drainage     Should you experience any significant changes in your wound(s) or have any questions regarding your home care instructions please contact the [facility] center [facility] @ [phone] If after regular business hours, please call your family doctor or local emergency room. The treatment plan has been discussed at length between you and your provider. Follow all instructions carefully, it is very important. If you do not follow all instructions you are at risk of your wound not healing, infection, possible loss of limb and even loss of life.    Anay Finn FNP-C, CWCN-AP, CFCN, CSWS, WCC, DWC  5/22/2024

## 2024-05-22 ENCOUNTER — OFFICE VISIT (OUTPATIENT)
Dept: WOUND CARE | Facility: HOSPITAL | Age: 84
End: 2024-05-22
Attending: NURSE PRACTITIONER
Payer: MEDICARE

## 2024-05-22 VITALS
TEMPERATURE: 97 F | DIASTOLIC BLOOD PRESSURE: 80 MMHG | HEART RATE: 75 BPM | RESPIRATION RATE: 16 BRPM | SYSTOLIC BLOOD PRESSURE: 157 MMHG

## 2024-05-22 DIAGNOSIS — I87.332 CHRONIC VENOUS HYPERTENSION WITH ULCER AND INFLAMMATION, LEFT (HCC): Primary | ICD-10-CM

## 2024-05-22 DIAGNOSIS — L97.325 NON-PRESSURE CHRONIC ULCER OF LEFT ANKLE WITH MUSCLE INVOLVEMENT WITHOUT EVIDENCE OF NECROSIS (HCC): ICD-10-CM

## 2024-05-22 PROCEDURE — 99214 OFFICE O/P EST MOD 30 MIN: CPT | Performed by: NURSE PRACTITIONER

## 2024-05-22 NOTE — PROGRESS NOTES
.Weekly Wound Education Note    Teaching Provided To: Patient;Family  Training Topics: Discharge instructions;Dressing;Compression;Edema control  Training Method: Explain/Verbal;Written  Training Response: Patient responds and understands;Reinforcement needed            Betamethasone to leg and foot, Aracelis Ag, xeroform to wound and covered with abd pad.  Calamine unna boot 30-40mmHg.  Will run insurance for skin substitute.

## 2024-05-22 NOTE — PATIENT INSTRUCTIONS
Please return:  1 week with Anay    Patient discharge and wound care instructions  Raissa Pate  5/22/2024         You may shower with protection of the wound (ie a cast cover or similar).     Cleansing/dressing:       In clinic/home health care, with each dressing change:    please cleanse the limb, foot, and between toes with soap, water and washcloth.   Dry thoroughly.    Cleanse/soak the wound with VASHE (or other hypochlorous wound cleanser), dab dry.    Apply the following dressings:   betamethasone to periwound>sheila>xeroform> 30-40mmhg calamine compression    Managing your edema:  Avoid prolonged standing in one place. It is better to have your calf muscles moving to pump fluid out of the legs      Elevate leg(s) above the level of the heart when sitting or as much as possible.  Take you diuretics as directed by your physician. Do not skip doses or change doses unless instructed to do so by your physician.  Decrease salt intake, follow recommended 2 grams daily.  Do not get leg(s) with compression wrap wet. If wraps are too tight as indicated by pain, numbness/tingling or discoloration of toes remove wrap completely and call the wound center @ [phone].  Refer to the \"Multi-layer compression bandage application patient information sheet\" given to you in clinic.     What Are Compression Wraps?   Compression wraps are elastic bandages that wrap around a part of your body to keep swelling down. The wraps create pressure which pushes extra fluid out of a certain area. This improves blood flow to that part of the body and helps it heal faster.   Compression wraps come in different styles. Your doctor will recommend the best compression wrap for your needs.  How Do I Take Care of a Compression Wrap?   Compression wraps can be worn for up to seven days if you take good care of them. Here's how to make them last and keep them working right:   Keep them clean and dry until your next doctor's appointment.    Wear thin, stretchable stockings over the wrap to hold it in place. This keeps the wrap from sticking to your sheets while sleeping. It also keeps your clothing from sticking to the wrap.   Wear shoes that can fit comfortably around a wrap that covers your leg and foot.       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 Patient has been compliant with 30-40 mmHg compression bandaging, elevation, and exercise over the past 4 weeks, but still presents with significant swelling and hyperpigmentation and ulceration.      Nutrition and blood sugar control:  Focus on the following:  Protein: Meats, beans, eggs, milk and yogurt particularly Greek yogurt), tofu, soy nuts, soy protein products (Follow the protein handout in your welcome folder)  Vitamin C: Citrus fruits and juices, strawberries, tomatoes, tomato juice, peppers, baked potatoes, spinach, broccoli, cauliflower, [place] sprouts, cabbage  Vitamin A: Dark green, leafy vegetables, orange or yellow vegetables, cantaloupe, fortified dairy products, liver, fortified cereals  Zinc: Fortified cereals, red meats, seafood  Consider supplementing with Dao by [employer]. It can be purchased on amazon, [employer] website, or local pharmacy may be able to order it for you.  (These are essential branch chain amino acids that help with tissue building and wound healing).     Concerns:  Signs of infection may include the following:  Increase in redness  Red \"streaks\" from wound  Increase in swelling  Fever  Unusual odor  Change in the amount of wound drainage     Should you experience any significant changes in your wound(s) or have any questions regarding your home care instructions please contact the wound center [facility] @ [phone] If after regular business hours, please call your family doctor or local emergency room. The treatment plan has been discussed at length between you and your  provider. Follow all instructions carefully, it is very important. If you do not follow all instructions you are at risk of your wound not healing, infection, possible loss of limb and even loss of life.

## 2024-05-28 NOTE — PROGRESS NOTES
CHIEF COMPLAINT:     Chief Complaint   Patient presents with    Wound Care     Patient is here for a follow up visit for a left ankle wound.     HPI:   Information obtained from patient and chart  5-8-24 INITIAL  84 yo with pmhx of htn, high cholesterol, venous insufficiency with ulceration.  She presented to [facility] with left foot ulceration, patient was started on Keflex, which patient completed today.  Patient is here today with her son. She has been utilizing neosporin to the wound.  Her recent esr was wnl. Son reports that patient had ulcerations many years ago, and then when she went to [facility] for many years they healed.  She returned here to the [facility] and is living with her son and has redeveloped the ulceration. He states she sleeps in a bed, but spends a lot of time in the kitchen at the counter even though they advise her to sit and elevate her legs.  She has scarring and hypopigmentation noted on the left leg and venous changes bilaterally.  They have seen a Dr. Sinclair (son is not sure what organization md is with) and they will be doing a vein mapping at the end of may.  We discussed that the ulceration is directly over the malleloous. Will get xray. Will start compression and advanced wound dressings. Today there is not any s/s of infection. Patient does have pain with movement and touch to the area. But no visible movement in the wound bed.    5-15-24 patient returns, she did get xray done, patient with mild soft tissue swelling in hindfoot and small heel spurs noted. Patient came  for rn visit late last week. Edema not significantly reduced, wound is improved and I was able to debride and get it clean enough to transition to collagen. We also discussed compression pumps and they are interested, will start that process. No s/s of infection.    5-22-24 patient returns, last week we debrided the wound and transitioned to collagen.  They trialed the pumps yesterday and patient liked them. Patient also had  her vein mapping done yesterday, they don't have results yet. Their appointment with dr. Sinclair is not until end of June, I encouraged them to maybe move that appointment sooner if able.  Today, the wound is slightly smaller and more granular. Patient c/o pain and itching with manipulation of the area and itching at noc. No acute s/s of infection. Will run insurance for epifix.    5-29-24 patient returns with [facility].  The venous reflux study conclusions are below: patient with severe reflux in the left saphenous and left . Follow-up appointment with vascular remains for later in June.  Wound is improved. Epifix is approved. No s/s of infection. Patient states she has pain at the end of the day. Will place epifix today.      MEDICATIONS:     Current Outpatient Medications:     losartan 25 MG Oral Tab, Take 40 mg by mouth daily., Disp: , Rfl:     Solifenacin Succinate 5 MG Oral Tab, Take 1 tablet (5 mg total) by mouth daily., Disp: , Rfl:     telmisartan 40 MG Oral Tab, Take 1 tablet (40 mg total) by mouth daily., Disp: , Rfl:     cephalexin 500 MG Oral Cap, Take 1 capsule (500 mg total) by mouth 2 (two) times daily. (Patient not taking: Reported on 5/8/2024), Disp: , Rfl:     naproxen 500 MG Oral Tab, Take 1 tablet (500 mg total) by mouth 2 (two) times daily as needed., Disp: 20 tablet, Rfl: 0    gabapentin 300 MG Oral Cap, Take 1 capsule (300 mg total) by mouth nightly., Disp: 30 capsule, Rfl: 0    metoprolol succinate 12.5 mg Oral Tab, Take 1 Partial Tablet (12.5 mg total) by mouth Daily Beta Blocker., Disp: , Rfl:     Levothyroxine Sodium 100 MCG Oral Tab, Take 1 tablet (100 mcg total) by mouth before breakfast., Disp: , Rfl:     atorvastatin 10 MG Oral Tab, Take 1 tablet (10 mg total) by mouth nightly., Disp: , Rfl:     Meclizine HCl 25 MG Oral Tab, Take 1 tablet (25 mg total) by mouth 3 (three) times daily as needed., Disp: 20 tablet, Rfl: 0  ALLERGIES:   No Known Allergies   REVIEW OF SYSTEMS:    This information was obtained from the patient/family and chart.    See HPI for pertinent positives, otherwise 10 pt ROS negative.  HISTORY:   Past medical, surgical, family and social history updated where appropriate.    PHYSICAL EXAM:     Vitals:    05/29/24 0700   BP: 158/64   Pulse: 81   Resp: 14   Temp: 98.5 °F (36.9 °C)     Estimated body mass index is 23.25 kg/m² as calculated from the following:    Height as of 5/8/24: 60\".    Weight as of 5/8/24: 119 lb 0.8 oz (54 kg).   POC Glucose   Date Value Ref Range Status   12/08/2017 128 (H) 65 - 99 mg/dL Final       Vital signs reviewed.Appears stated age, well groomed.    Constitutional:  Bp wnl for patient. Pulse Regular and wnl for patient. Respirations easy and unlabored. Temperature wnl. Elevated bmi. Appearance neat and clean. Appears in no acute distress. Well nourished and well developed.    Lower extremity:  dp/pt palpable left. Left  lower extremity + for varicosities, +edema stable, +hyperpigmentation and hypopigmentation on the left, scarring noted on the left. Capillary refill < 3 seconds. Digits are warm. toenails are wnl for color, thickness and hygeine. Skin hydration wnl. no hairgrowth on legs.    Musculoskeletal:  Gait and station stable   Integumentary:  refer to wound characteristics and images   Psychiatric:  Judgment and insight intact. Alert and oriented times 3. No evidence of depression, anxiety, or agitation. Calm, cooperative, and communicative.   EDEMA:   Calf  Point of Measurement - Left Calf: 31     Left Calf from:: Heel  Calf Left cm:: 34.2        Ankle  Point of Measurement - Left Ankle: 10     Left Ankle from:: Heel  Left Ankle cm:: 19.5              DIAGNOSTICS:     Lab Results   Component Value Date    BUN 24 (H) 05/02/2024    CREATSERUM 1.12 (H) 05/02/2024    ALB 3.5 05/02/2024    TP 8.1 05/02/2024 5-21-24 venous reflux study-advocate  1. There is no evidence of acute or chronic deep vein or superficial vein  thrombosis in  the right lower extremity and left lower extremity where  visualized.  2. There is no evidence of deep vein insufficiency in the right lower  extremity and left lower extremity where visualized.  3. There is no evidence of venous insufficiency in the right anterior  accessory saphenous vein and left anterior accessory saphenous vein.  4. There is no evidence of venous insufficiency in the left small saphenous  vein.  5. Severe reflux in the right great saphenous vein and left great saphenous  vein.  6. Severe reflux in the right small saphenous vein.  7. Severe reflux in the right calf tributaries and left calf tributaries.  8. Severe reflux in the left incompetent .     5-8-24 left ankle xray  CONCLUSION:    Overlying bandage material obscures fine osseous detail.    No appreciable fracture or traumatic malalignment.  The ankle mortise and joint spaces of the hindfoot are preserved.  Small Achilles and plantar calcaneal spurs noted.  Mild soft tissue swelling diffusely about the hindfoot.  No focal areas of   ulceration identified.     WOUND ASSESSMENT:     Wound 05/08/24 #1- Left Medial Ankle Ankle Left (Active)   Date First Assessed/Time First Assessed: 05/08/24 1421    Wound Number (Wound Clinic Only): #1- Left Medial Ankle  Primary Wound Type: Venous Ulcer  Location: Ankle  Wound Location Orientation: Left      Assessments 5/8/2024  2:22 PM 5/29/2024  9:14 AM   Wound Image       Drainage Amount Moderate Small   Drainage Description Serous;Yellow Serosanguineous   Treatments Compression --   Wound Length (cm) 2 cm 1.1 cm   Wound Width (cm) 2.4 cm 1.5 cm   Wound Surface Area (cm^2) 4.8 cm^2 1.65 cm^2   Wound Depth (cm) 0.3 cm 0.1 cm   Wound Volume (cm^3) 1.44 cm^3 0.165 cm^3   Wound Healing % -- 89   Margins Well-defined edges Well-defined edges   Non-staged Wound Description Full thickness Full thickness   Meagan-wound Assessment Edema Edema;Hemosiderin staining   Wound Granulation Tissue -- Pink;Firm    Wound Bed Granulation (%) -- 60 %   Wound Bed Slough (%) 100 % 40 %   Wound Odor None None       Active Orders   Date Order Priority Status Authorizing Provider   05/29/24 0927 Cellular tissue product application Venous Ulcer Left Ankle Routine Active Jiosmanyco, Anay, APRN       Inactive Orders   Date Order Priority Status Authorizing Provider   05/15/24 0925 Debridement Venous Ulcer Left Ankle Routine Completed Jimarisa, Anay, APRN       Compression Wrap 05/08/24 Ankle Anterior;Left (Active)   Placement Date/Time: 05/08/24 1545   Location: Ankle  Wound Location Orientation: Anterior;Left      Assessments 5/8/2024  3:45 PM 5/22/2024 10:49 AM   Response to Treatment Well tolerated Well tolerated   Compression Layers Multilayer Multilayer   Compression Product Type Unna Boot Unna Boot   Dressing Applied Yes Yes   Compression Wrap Location Toes to Knee Toes to Knee   Compression Wrap Status Clean;Dry Clean;Dry       No associated orders.      PROCEDURE:      This procedure is medical necessary to nourish the wound bed with extracellular matrix proteins, growth factors, cytokines and other specialty proteins to assist in re-epitheliazation and more rapid healing, decreasing the risk of infection.  See rn px note    ASSESSMENT AND PLAN:    1. Chronic venous hypertension with ulcer and inflammation, left (HCC)    2. 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  Questions and concerns addressed. Red flags to RTC or ED reviewed.  Patient (or parent) agrees to plan.      NOTE TO PATIENT: The 21st Century Cures Act makes clinical notes like these available to patients in the interest of transparency. Clinical notes are medical documents used by physicians and care providers to communicate with each other. These documents include medical language and terminology, abbreviations, and  treatment information that may sound technical and at times possibly unfamiliar. In addition, at times, the verbiage may appear blunt or direct. These documents are one tool providers use to communicate relevant information and clinical opinions of the care providers in a way that allows common understanding of the clinical context.    I [id number] with the patient. This time included:    preparing to see the patient (eg, review notes and recent diagnostics),  seeing the patient, obtaining and/or reviewing separately obtained history, performing a medically appropriate examination and/or evaluation, counseling and educating the patient, documenting in the record. Bill epifix placement only  DISCHARGE:      Patient Instructions   Please return:  1 week with Anay    June 12 & June 19 with Dr. Christianson    June 26 with Anay    Patient discharge and wound care instructions  Raissa Stoneneymar  5/29/2024           You may shower with protection of the wound (ie a cast cover or similar).     Cleansing/dressing:       In clinic/home health care, with each dressing change:    please cleanse the limb, foot, and between toes with soap, water and washcloth.   Dry thoroughly.    Cleanse/soak the wound with VASHE (or other hypochlorous wound cleanser), dab dry.    Apply the following dressings:   betamethasone to periwound>epifiix 14 mm>xeroform> 30-40mmhg calamine compression    Managing your edema:  Avoid prolonged standing in one place. It is better to have your calf muscles moving to pump fluid out of the legs      Elevate leg(s) above the level of the heart when sitting or as much as possible.  Take you diuretics as directed by your physician. Do not skip doses or change doses unless instructed to do so by your physician.  Decrease salt intake, follow recommended 2 grams daily.  Do not get leg(s) with compression wrap wet. If wraps are too tight as indicated by pain, numbness/tingling or discoloration of toes remove  wrap completely and call the wound center @ [phone].  Refer to the \"Multi-layer compression bandage application patient information sheet\" given to you in clinic.     What Are Compression Wraps?   Compression wraps are elastic bandages that wrap around a part of your body to keep swelling down. The wraps create pressure which pushes extra fluid out of a certain area. This improves blood flow to that part of the body and helps it heal faster.   Compression wraps come in different styles. Your doctor will recommend the best compression wrap for your needs.  How Do I Take Care of a Compression Wrap?   Compression wraps can be worn for up to seven days if you take good care of them. Here's how to make them last and keep them working right:   Keep them clean and dry until your next doctor's appointment.   Wear thin, stretchable stockings over the wrap to hold it in place. This keeps the wrap from sticking to your sheets while sleeping. It also keeps your clothing from sticking to the wrap.   Wear shoes that can fit comfortably around a wrap that covers your leg and foot.       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 Patient has been compliant with 30-40 mmHg compression bandaging, elevation, and exercise over the past 4 weeks, but still presents with significant swelling and hyperpigmentation and ulceration.      Nutrition and blood sugar control:  Focus on the following:  Protein: Meats, beans, eggs, milk and yogurt particularly Greek yogurt), tofu, soy nuts, soy protein products (Follow the protein handout in your welcome folder)  Vitamin C: Citrus fruits and juices, strawberries, tomatoes, tomato juice, peppers, baked potatoes, spinach, broccoli, cauliflower, [place] sprouts, cabbage  Vitamin A: Dark green, leafy vegetables, orange or yellow vegetables, cantaloupe, fortified dairy products, liver, fortified cereals  Zinc: Fortified  cereals, red meats, seafood  Consider supplementing with Dao by [employer]. It can be purchased on amazon, [employer] website, or local pharmacy may be able to order it for you.  (These are essential branch chain amino acids that help with tissue building and wound healing).     Concerns:  Signs of infection may include the following:  Increase in redness  Red \"streaks\" from wound  Increase in swelling  Fever  Unusual odor  Change in the amount of wound drainage     Should you experience any significant changes in your wound(s) or have any questions regarding your home care instructions please contact the [facility] @ [phone] If after regular business hours, please call your family doctor or local emergency room. The treatment plan has been discussed at length between you and your provider. Follow all instructions carefully, it is very important. If you do not follow all instructions you are at risk of your wound not healing, infection, possible loss of limb and even loss of life.    Anay Finn FNP-C, CWCN-AP, CFCN, CSWS, WCC, DWC  5/29/2024

## 2024-05-29 ENCOUNTER — OFFICE VISIT (OUTPATIENT)
Dept: WOUND CARE | Facility: HOSPITAL | Age: 84
End: 2024-05-29
Attending: NURSE PRACTITIONER
Payer: MEDICARE

## 2024-05-29 VITALS
HEART RATE: 81 BPM | TEMPERATURE: 99 F | DIASTOLIC BLOOD PRESSURE: 64 MMHG | RESPIRATION RATE: 14 BRPM | SYSTOLIC BLOOD PRESSURE: 158 MMHG

## 2024-05-29 DIAGNOSIS — I87.332 CHRONIC VENOUS HYPERTENSION WITH ULCER AND INFLAMMATION, LEFT (HCC): Primary | ICD-10-CM

## 2024-05-29 DIAGNOSIS — L97.325 NON-PRESSURE CHRONIC ULCER OF LEFT ANKLE WITH MUSCLE INVOLVEMENT WITHOUT EVIDENCE OF NECROSIS (HCC): ICD-10-CM

## 2024-05-29 PROCEDURE — 15271 SKIN SUB GRAFT TRNK/ARM/LEG: CPT | Performed by: NURSE PRACTITIONER

## 2024-05-29 NOTE — PROGRESS NOTES
Patient ID: Raissa Pate is a 83 year old female.    Cellular tissue product application Venous Ulcer Left Ankle    Date/Time: 5/29/2024 9:27 AM    Performed by: Anay Finn APRN  Authorized by: Anay Finn APRN  Associated wounds:   Wound 05/08/24 #1- Left Medial Ankle Ankle Left  Consent:     Consent obtained:  Verbal    Consent given by:  Patient  Anesthesia (see MAR for exact dosages):     Anesthesia method:  Topical application  Procedure details:     Location:  trunk/arms/legs    Product applied:  Epifix    Product lot #:  [id number]    Product expiration:  2/1/2029    Amount used (cm^2):  2    Amount wasted (cm^2):  0    Secured/Fixated: Yes      Secured/Fixated with:  Xeroform, abd pad  Post-procedure details:     Patient tolerance of procedure:  Tolerated well, no immediate complications

## 2024-05-29 NOTE — PROGRESS NOTES
.Weekly Wound Education Note    Teaching Provided To: Patient;Family  Training Topics: Discharge instructions;Dressing;Skin substitute;Edema control;Compression  Training Method: Explain/Verbal;Written  Training Response: Reinforcement needed;Patient responds and understands            First application of Epifix, covered with xeroform, abd pad.  Calamine unna boot 30-40mmHg.

## 2024-05-29 NOTE — PATIENT INSTRUCTIONS
Please return:  1 week with Anay    June 12 & June 19 with Dr. Christianson    June 26 with Anay    Patient discharge and wound care instructions  Raissa Pate  5/29/2024           You may shower with protection of the wound (ie a cast cover or similar).     Cleansing/dressing:       In clinic/home health care, with each dressing change:    please cleanse the limb, foot, and between toes with soap, water and washcloth.   Dry thoroughly.    Cleanse/soak the wound with VASHE (or other hypochlorous wound cleanser), dab dry.    Apply the following dressings:   betamethasone to periwound>epifiix 14 mm>xeroform> 30-40mmhg calamine compression    Managing your edema:  Avoid prolonged standing in one place. It is better to have your calf muscles moving to pump fluid out of the legs      Elevate leg(s) above the level of the heart when sitting or as much as possible.  Take you diuretics as directed by your physician. Do not skip doses or change doses unless instructed to do so by your physician.  Decrease salt intake, follow recommended 2 grams daily.  Do not get leg(s) with compression wrap wet. If wraps are too tight as indicated by pain, numbness/tingling or discoloration of toes remove wrap completely and call the wound center @ [phone].  Refer to the \"Multi-layer compression bandage application patient information sheet\" given to you in clinic.     What Are Compression Wraps?   Compression wraps are elastic bandages that wrap around a part of your body to keep swelling down. The wraps create pressure which pushes extra fluid out of a certain area. This improves blood flow to that part of the body and helps it heal faster.   Compression wraps come in different styles. Your doctor will recommend the best compression wrap for your needs.  How Do I Take Care of a Compression Wrap?   Compression wraps can be worn for up to seven days if you take good care of them. Here's how to make them last and keep them working  right:   Keep them clean and dry until your next doctor's appointment.   Wear thin, stretchable stockings over the wrap to hold it in place. This keeps the wrap from sticking to your sheets while sleeping. It also keeps your clothing from sticking to the wrap.   Wear shoes that can fit comfortably around a wrap that covers your leg and foot.       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 Patient has been compliant with 30-40 mmHg compression bandaging, elevation, and exercise over the past 4 weeks, but still presents with significant swelling and hyperpigmentation and ulceration.      Nutrition and blood sugar control:  Focus on the following:  Protein: Meats, beans, eggs, milk and yogurt particularly Greek yogurt), tofu, soy nuts, soy protein products (Follow the protein handout in your welcome folder)  Vitamin C: Citrus fruits and juices, strawberries, tomatoes, tomato juice, peppers, baked potatoes, spinach, broccoli, cauliflower, [place] sprouts, cabbage  Vitamin A: Dark green, leafy vegetables, orange or yellow vegetables, cantaloupe, fortified dairy products, liver, fortified cereals  Zinc: Fortified cereals, red meats, seafood  Consider supplementing with Dao by [employer]. It can be purchased on amazon, [employer] website, or local pharmacy may be able to order it for you.  (These are essential branch chain amino acids that help with tissue building and wound healing).     Concerns:  Signs of infection may include the following:  Increase in redness  Red \"streaks\" from wound  Increase in swelling  Fever  Unusual odor  Change in the amount of wound drainage     Should you experience any significant changes in your wound(s) or have any questions regarding your home care instructions please contact the wound center [facility] @ [phone] If after regular business hours, please call your family doctor or local emergency room. The  treatment plan has been discussed at length between you and your provider. Follow all instructions carefully, it is very important. If you do not follow all instructions you are at risk of your wound not healing, infection, possible loss of limb and even loss of life.

## 2024-06-04 NOTE — PROGRESS NOTES
CHIEF COMPLAINT:     Chief Complaint   Patient presents with    Wound Care     Patient is here for a wound care follow up. Her pain is currently 7/10.      HPI:   Information obtained from patient and chart  5-8-24 INITIAL  82 yo with pmhx of htn, high cholesterol, venous insufficiency with ulceration.  She presented to [facility] with left foot ulceration, patient was started on Keflex, which patient completed today.  Patient is here today with her son. She has been utilizing neosporin to the wound.  Her recent esr was wnl. Son reports that patient had ulcerations many years ago, and then when she went to [facility] for many years they healed.  She returned here to the [facility] and is living with her son and has redeveloped the ulceration. He states she sleeps in a bed, but spends a lot of time in the kitchen at the counter even though they advise her to sit and elevate her legs.  She has scarring and hypopigmentation noted on the left leg and venous changes bilaterally.  They have seen a Dr. Sinclair (son is not sure what organization md is with) and they will be doing a vein mapping at the end of may.  We discussed that the ulceration is directly over the malleloous. Will get xray. Will start compression and advanced wound dressings. Today there is not any s/s of infection. Patient does have pain with movement and touch to the area. But no visible movement in the wound bed.    5-15-24 patient returns, she did get xray done, patient with mild soft tissue swelling in hindfoot and small heel spurs noted. Patient came  for rn visit late last week. Edema not significantly reduced, wound is improved and I was able to debride and get it clean enough to transition to collagen. We also discussed compression pumps and they are interested, will start that process. No s/s of infection.    5-22-24 patient returns, last week we debrided the wound and transitioned to collagen.  They trialed the pumps yesterday and patient liked them. Patient  also had her vein mapping done yesterday, they don't have results yet. Their appointment with dr. Sinclair is not until end of June, I encouraged them to maybe move that appointment sooner if able.  Today, the wound is slightly smaller and more granular. Patient c/o pain and itching with manipulation of the area and itching at noc. No acute s/s of infection. Will run insurance for epifix.    5-29-24 patient returns with [facility].  The venous reflux study conclusions are below: patient with severe reflux in the left saphenous and left . Follow-up appointment with vascular remains for later in June.  Wound is improved. Epifix is approved. No s/s of infection. Patient states she has pain at the end of the day. Will place epifix today.    6-5-24 patient returns, last week we placed epifix.  They were unable to get Vascular moved up to earlier date.  The wound is smaller today, there is not a small epifix disc available-will utilize sheila. Patient is c/o pain and itching along medial aspect of anterior tibia.  There is slight redness noted there.  Will decrease the wrap to 20-30mmhg and add padding along anterior tibia. Will also treat itching with betamethasone.  No acute s/s of infection. Patient will f/u with dr. Christianson in my [facility] for epifix placement prn  MEDICATIONS:     Current Outpatient Medications:     losartan 25 MG Oral Tab, Take 40 mg by mouth daily., Disp: , Rfl:     Solifenacin Succinate 5 MG Oral Tab, Take 1 tablet (5 mg total) by mouth daily., Disp: , Rfl:     telmisartan 40 MG Oral Tab, Take 1 tablet (40 mg total) by mouth daily., Disp: , Rfl:     cephalexin 500 MG Oral Cap, Take 1 capsule (500 mg total) by mouth 2 (two) times daily. (Patient not taking: Reported on 5/8/2024), Disp: , Rfl:     naproxen 500 MG Oral Tab, Take 1 tablet (500 mg total) by mouth 2 (two) times daily as needed., Disp: 20 tablet, Rfl: 0    metoprolol succinate 12.5 mg Oral Tab, Take 1 Partial Tablet (12.5 mg total)  by mouth Daily Beta Blocker., Disp: , Rfl:     Levothyroxine Sodium 100 MCG Oral Tab, Take 1 tablet (100 mcg total) by mouth before breakfast., Disp: , Rfl:     atorvastatin 10 MG Oral Tab, Take 1 tablet (10 mg total) by mouth nightly., Disp: , Rfl:     Meclizine HCl 25 MG Oral Tab, Take 1 tablet (25 mg total) by mouth 3 (three) times daily as needed., Disp: 20 tablet, Rfl: 0  ALLERGIES:   No Known Allergies   REVIEW OF SYSTEMS:   This information was obtained from the patient/family and chart.    See HPI for pertinent positives, otherwise 10 pt ROS negative.  HISTORY:   Past medical, surgical, family and social history updated where appropriate.    PHYSICAL EXAM:     Vitals:    06/05/24 0907   BP: 130/76   Pulse: 72   Resp: 16   Temp: 98.1 °F (36.7 °C)       Estimated body mass index is 23.25 kg/m² as calculated from the following:    Height as of 5/8/24: 60\".    Weight as of 5/8/24: 119 lb 0.8 oz (54 kg).   POC Glucose   Date Value Ref Range Status   12/08/2017 128 (H) 65 - 99 mg/dL Final       Vital signs reviewed.Appears stated age, well groomed.    Constitutional:  Bp wnl for patient. Pulse Regular and wnl for patient. Respirations easy and unlabored. Temperature wnl. Elevated bmi. Appearance neat and clean. Appears in no acute distress. Well nourished and well developed.    Lower extremity:  dp/pt palpable left. Left  lower extremity + for varicosities, +edema stable, +hyperpigmentation and hypopigmentation on the left, scarring noted on the left. Capillary refill < 3 seconds. Digits are warm. toenails are wnl for color, thickness and hygeine. Skin hydration wnl. no hairgrowth on legs.    Musculoskeletal:  Gait and station stable   Integumentary:  refer to wound characteristics and images   Psychiatric:  Judgment and insight intact. Alert and oriented times 3. No evidence of depression, anxiety, or agitation. Calm, cooperative, and communicative.   EDEMA:   Calf  Point of Measurement - Left Calf: 30     Left  Calf from:: Heel  Calf Left cm:: 32.5        Ankle  Point of Measurement - Left Ankle: 10     Left Ankle from:: Heel  Left Ankle cm:: 19.4              DIAGNOSTICS:     Lab Results   Component Value Date    BUN 24 (H) 05/02/2024    CREATSERUM 1.12 (H) 05/02/2024    ALB 3.5 05/02/2024    TP 8.1 05/02/2024 5-21-24 venous reflux study-advocate  1. There is no evidence of acute or chronic deep vein or superficial vein  thrombosis in the right lower extremity and left lower extremity where  visualized.  2. There is no evidence of deep vein insufficiency in the right lower  extremity and left lower extremity where visualized.  3. There is no evidence of venous insufficiency in the right anterior  accessory saphenous vein and left anterior accessory saphenous vein.  4. There is no evidence of venous insufficiency in the left small saphenous  vein.  5. Severe reflux in the right great saphenous vein and left great saphenous  vein.  6. Severe reflux in the right small saphenous vein.  7. Severe reflux in the right calf tributaries and left calf tributaries.  8. Severe reflux in the left incompetent .     5-8-24 left ankle xray  CONCLUSION:    Overlying bandage material obscures fine osseous detail.    No appreciable fracture or traumatic malalignment.  The ankle mortise and joint spaces of the hindfoot are preserved.  Small Achilles and plantar calcaneal spurs noted.  Mild soft tissue swelling diffusely about the hindfoot.  No focal areas of   ulceration identified.     WOUND ASSESSMENT:     Wound 05/08/24 #1- Left Medial Ankle Ankle Left (Active)   Date First Assessed/Time First Assessed: 05/08/24 1421    Wound Number (Wound Clinic Only): #1- Left Medial Ankle  Primary Wound Type: Venous Ulcer  Location: Ankle  Wound Location Orientation: Left      Assessments 5/8/2024  2:22 PM 6/5/2024  9:12 AM   Wound Image       Drainage Amount Moderate Small   Drainage Description Serous;Yellow Serosanguineous   Treatments  Compression Compression   Wound Length (cm) 2 cm 0.8 cm   Wound Width (cm) 2.4 cm 1.3 cm   Wound Surface Area (cm^2) 4.8 cm^2 1.04 cm^2   Wound Depth (cm) 0.3 cm 0.1 cm   Wound Volume (cm^3) 1.44 cm^3 0.104 cm^3   Wound Healing % -- 93   Margins Well-defined edges Well-defined edges   Non-staged Wound Description Full thickness Full thickness   Meagan-wound Assessment Edema Edema;Hemosiderin staining;Dry   Wound Granulation Tissue -- Spongy;Pink;Red   Wound Bed Granulation (%) -- 50 %   Wound Bed Epithelium (%) -- 20 %   Wound Bed Slough (%) 100 % 30 %   Wound Odor None None   Shape -- Bridged   Tunneling? -- No   Undermining? -- No   Sinus Tracts? -- No       Inactive Orders   Date Order Priority Status Authorizing Provider   05/29/24 0927 Cellular tissue product application Venous Ulcer Left Ankle Routine Completed Anay Finn APRN   05/15/24 0925 Debridement Venous Ulcer Left Ankle Routine Completed Anay Finn APRN       Compression Wrap 05/08/24 Ankle Anterior;Left (Active)   Placement Date/Time: 05/08/24 1545   Location: Ankle  Wound Location Orientation: Anterior;Left      Assessments 5/8/2024  3:45 PM 6/5/2024  9:33 AM   Response to Treatment Well tolerated Well tolerated   Compression Layers Multilayer Multilayer   Compression Product Type Unna Boot Unna Boot   Dressing Applied Yes Yes   Compression Wrap Location Toes to Knee Toes to Knee   Compression Wrap Status Clean;Dry Dry;Clean       No associated orders.          ASSESSMENT AND PLAN:    1. Chronic venous hypertension with ulcer and inflammation, left (HCC)    2. 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  Questions and concerns addressed. Red flags to RTC or ED reviewed.  Patient (or parent) agrees to plan.      NOTE TO PATIENT: The 21st Century Cures Act makes clinical notes like these available to  patients in the interest of transparency. Clinical notes are medical documents used by physicians and care providers to communicate with each other. These documents include medical language and terminology, abbreviations, and treatment information that may sound technical and at times possibly unfamiliar. In addition, at times, the verbiage may appear blunt or direct. These documents are one tool providers use to communicate relevant information and clinical opinions of the care providers in a way that allows common understanding of the clinical context.    I [id number] with the patient. This time included:    preparing to see the patient (eg, review notes and recent diagnostics),  seeing the patient, obtaining and/or reviewing separately obtained history, performing a medically appropriate examination and/or evaluation, counseling and educating the patient, documenting in the record.   DISCHARGE:      Patient Instructions   Please return:  June 12 & June 19 with Dr. Christianson (epifix placement?)    June 26 with Anay    Patient discharge and wound care instructions  Raissa Pate  6/5/2024    You may shower with protection of the wound (ie a cast cover or similar).     Cleansing/dressing:       In clinic/home health care, with each dressing change:    please cleanse the limb, foot, and between toes with soap, water and washcloth.   Dry thoroughly.    Cleanse/soak the wound with VASHE (or other hypochlorous wound cleanser), dab dry.    Apply the following dressings:   betamethasone>sheila>xeroform> 20-30mmhg calamine compression with rosadil padding on anterior ankle and anterior tibia    Managing your edema:  Avoid prolonged standing in one place. It is better to have your calf muscles moving to pump fluid out of the legs      Elevate leg(s) above the level of the heart when sitting or as much as possible.  Take you diuretics as directed by your physician. Do not skip doses or change doses unless instructed  to do so by your physician.  Decrease salt intake, follow recommended 2 grams daily.  Do not get leg(s) with compression wrap wet. If wraps are too tight as indicated by pain, numbness/tingling or discoloration of toes remove wrap completely and call the wound center @ [phone].  Refer to the \"Multi-layer compression bandage application patient information sheet\" given to you in clinic.     What Are Compression Wraps?   Compression wraps are elastic bandages that wrap around a part of your body to keep swelling down. The wraps create pressure which pushes extra fluid out of a certain area. This improves blood flow to that part of the body and helps it heal faster.   Compression wraps come in different styles. Your doctor will recommend the best compression wrap for your needs.  How Do I Take Care of a Compression Wrap?   Compression wraps can be worn for up to seven days if you take good care of them. Here's how to make them last and keep them working right:   Keep them clean and dry until your next doctor's appointment.   Wear thin, stretchable stockings over the wrap to hold it in place. This keeps the wrap from sticking to your sheets while sleeping. It also keeps your clothing from sticking to the wrap.   Wear shoes that can fit comfortably around a wrap that covers your leg and foot.       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 Patient has been compliant with 30-40 mmHg compression bandaging, elevation, and exercise over the past 4 weeks, but still presents with significant swelling and hyperpigmentation and ulceration.      Nutrition and blood sugar control:  Focus on the following:  Protein: Meats, beans, eggs, milk and yogurt particularly Greek yogurt), tofu, soy nuts, soy protein products (Follow the protein handout in your welcome folder)  Vitamin C: Citrus fruits and juices, strawberries, tomatoes, tomato juice, peppers,  baked potatoes, spinach, broccoli, cauliflower, [place] sprouts, cabbage  Vitamin A: Dark green, leafy vegetables, orange or yellow vegetables, cantaloupe, fortified dairy products, liver, fortified cereals  Zinc: Fortified cereals, red meats, seafood  Consider supplementing with Dao by [employer]. It can be purchased on amazon, [employer] website, or local pharmacy may be able to order it for you.  (These are essential branch chain amino acids that help with tissue building and wound healing).     Concerns:  Signs of infection may include the following:  Increase in redness  Red \"streaks\" from wound  Increase in swelling  Fever  Unusual odor  Change in the amount of wound drainage     Should you experience any significant changes in your wound(s) or have any questions regarding your home care instructions please contact the [facility] @ [phone] If after regular business hours, please call your family doctor or local emergency room. The treatment plan has been discussed at length between you and your provider. Follow all instructions carefully, it is very important. If you do not follow all instructions you are at risk of your wound not healing, infection, possible loss of limb and even loss of life.    Anay Finn FNP-C, CWCN-AP, CFCN, CSWS, WCC, DWC  6/5/2024

## 2024-06-05 ENCOUNTER — OFFICE VISIT (OUTPATIENT)
Dept: WOUND CARE | Facility: HOSPITAL | Age: 84
End: 2024-06-05
Attending: NURSE PRACTITIONER
Payer: MEDICARE

## 2024-06-05 VITALS
RESPIRATION RATE: 16 BRPM | HEART RATE: 72 BPM | SYSTOLIC BLOOD PRESSURE: 130 MMHG | DIASTOLIC BLOOD PRESSURE: 76 MMHG | TEMPERATURE: 98 F

## 2024-06-05 DIAGNOSIS — L97.325 NON-PRESSURE CHRONIC ULCER OF LEFT ANKLE WITH MUSCLE INVOLVEMENT WITHOUT EVIDENCE OF NECROSIS (HCC): ICD-10-CM

## 2024-06-05 DIAGNOSIS — I87.332 CHRONIC VENOUS HYPERTENSION WITH ULCER AND INFLAMMATION, LEFT (HCC): Primary | ICD-10-CM

## 2024-06-05 PROCEDURE — 99214 OFFICE O/P EST MOD 30 MIN: CPT | Performed by: NURSE PRACTITIONER

## 2024-06-05 NOTE — PATIENT INSTRUCTIONS
Please return:  June 12 & June 19 with Dr. Christianson (epifix placement?)    June 26 with Anay    Patient discharge and wound care instructions  Raissa Pate  6/5/2024    You may shower with protection of the wound (ie a cast cover or similar).     Cleansing/dressing:       In clinic/home health care, with each dressing change:    please cleanse the limb, foot, and between toes with soap, water and washcloth.   Dry thoroughly.    Cleanse/soak the wound with VASHE (or other hypochlorous wound cleanser), dab dry.    Apply the following dressings:   betamethasone>sheila>xeroform> 20-30mmhg calamine compression with rosadil padding on anterior ankle and anterior tibia    Managing your edema:  Avoid prolonged standing in one place. It is better to have your calf muscles moving to pump fluid out of the legs      Elevate leg(s) above the level of the heart when sitting or as much as possible.  Take you diuretics as directed by your physician. Do not skip doses or change doses unless instructed to do so by your physician.  Decrease salt intake, follow recommended 2 grams daily.  Do not get leg(s) with compression wrap wet. If wraps are too tight as indicated by pain, numbness/tingling or discoloration of toes remove wrap completely and call the wound center @ [phone].  Refer to the \"Multi-layer compression bandage application patient information sheet\" given to you in clinic.     What Are Compression Wraps?   Compression wraps are elastic bandages that wrap around a part of your body to keep swelling down. The wraps create pressure which pushes extra fluid out of a certain area. This improves blood flow to that part of the body and helps it heal faster.   Compression wraps come in different styles. Your doctor will recommend the best compression wrap for your needs.  How Do I Take Care of a Compression Wrap?   Compression wraps can be worn for up to seven days if you take good care of them. Here's how to make them  last and keep them working right:   Keep them clean and dry until your next doctor's appointment.   Wear thin, stretchable stockings over the wrap to hold it in place. This keeps the wrap from sticking to your sheets while sleeping. It also keeps your clothing from sticking to the wrap.   Wear shoes that can fit comfortably around a wrap that covers your leg and foot.       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 Patient has been compliant with 30-40 mmHg compression bandaging, elevation, and exercise over the past 4 weeks, but still presents with significant swelling and hyperpigmentation and ulceration.      Nutrition and blood sugar control:  Focus on the following:  Protein: Meats, beans, eggs, milk and yogurt particularly Greek yogurt), tofu, soy nuts, soy protein products (Follow the protein handout in your welcome folder)  Vitamin C: Citrus fruits and juices, strawberries, tomatoes, tomato juice, peppers, baked potatoes, spinach, broccoli, cauliflower, [place] sprouts, cabbage  Vitamin A: Dark green, leafy vegetables, orange or yellow vegetables, cantaloupe, fortified dairy products, liver, fortified cereals  Zinc: Fortified cereals, red meats, seafood  Consider supplementing with Dao by [employer]. It can be purchased on amazon, [employer] website, or local pharmacy may be able to order it for you.  (These are essential branch chain amino acids that help with tissue building and wound healing).     Concerns:  Signs of infection may include the following:  Increase in redness  Red \"streaks\" from wound  Increase in swelling  Fever  Unusual odor  Change in the amount of wound drainage     Should you experience any significant changes in your wound(s) or have any questions regarding your home care instructions please contact the wound center [facility] @ [phone] If after regular business hours, please call your family doctor  or local emergency room. The treatment plan has been discussed at length between you and your provider. Follow all instructions carefully, it is very important. If you do not follow all instructions you are at risk of your wound not healing, infection, possible loss of limb and even loss of life.

## 2024-06-05 NOTE — PROGRESS NOTES
.Weekly Wound Education Note    Teaching Provided To: Patient;Family  Training Topics: Discharge instructions;Dressing;Edema control;Compression  Training Method: Explain/Verbal;Written  Training Response: Patient responds and understands;Reinforcement needed            Betamethasone to leg and foot.  Aracelis Ag, xeroform folded to wound, covered with abd pad.  Compression decreased to calamine unna boot 20-30mmHg, rosidol strip over anterior leg.

## 2024-06-12 ENCOUNTER — OFFICE VISIT (OUTPATIENT)
Dept: WOUND CARE | Facility: HOSPITAL | Age: 84
End: 2024-06-12
Attending: NURSE PRACTITIONER
Payer: MEDICARE

## 2024-06-12 VITALS
HEART RATE: 52 BPM | RESPIRATION RATE: 16 BRPM | TEMPERATURE: 98 F | SYSTOLIC BLOOD PRESSURE: 117 MMHG | DIASTOLIC BLOOD PRESSURE: 67 MMHG

## 2024-06-12 DIAGNOSIS — I87.332 CHRONIC VENOUS HYPERTENSION WITH ULCER AND INFLAMMATION, LEFT (HCC): Primary | ICD-10-CM

## 2024-06-12 DIAGNOSIS — R60.0 EDEMA OF LEFT LOWER LEG DUE TO PERIPHERAL VENOUS INSUFFICIENCY: ICD-10-CM

## 2024-06-12 DIAGNOSIS — L97.325 NON-PRESSURE CHRONIC ULCER OF LEFT ANKLE WITH MUSCLE INVOLVEMENT WITHOUT EVIDENCE OF NECROSIS (HCC): ICD-10-CM

## 2024-06-12 DIAGNOSIS — I87.2 EDEMA OF LEFT LOWER LEG DUE TO PERIPHERAL VENOUS INSUFFICIENCY: ICD-10-CM

## 2024-06-12 DIAGNOSIS — R23.8 SLOUGHING OF WOUND: ICD-10-CM

## 2024-06-12 PROCEDURE — 15271 SKIN SUB GRAFT TRNK/ARM/LEG: CPT | Performed by: INTERNAL MEDICINE

## 2024-06-12 NOTE — PROGRESS NOTES
Patient ID: Raissa Pate is a 83 year old female.    Cellular tissue product application Venous Ulcer Left Ankle    Date/Time: 6/12/2024 9:40 AM    Performed by: Marce Woodson MD  Authorized by: Marce Woodson MD  Associated wounds:   Wound 05/08/24 #1- Left Medial Ankle Ankle Left  Consent:     Consent obtained:  Verbal    Consent given by:  Patient  Anesthesia (see MAR for exact dosages):     Anesthesia method:  Topical application  Procedure details:     Location:  trunk/arms/legs    Product applied:  Epifix    Product lot #:  [id number]    Product expiration:  11/1/2028    Amount used (cm^2):  2    Amount wasted (cm^2):  0    Secured/Fixated: Yes      Secured/Fixated with:  Contact layer, hydrofera transfer, abd pad  Post-procedure details:     Patient tolerance of procedure:  Tolerated well, no immediate complications

## 2024-06-12 NOTE — PROGRESS NOTES
[facility] PROGRESS NOTE  TERENCE OCONNOR MD  6/12/2024    Chief Complaint:   Chief Complaint   Patient presents with    Wound Care     Follow up for left leg wound. No complaints at this time.        HPI:   Subjective   Raissa Pate is a 83 year old female coming in for a follow-up visit.    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     Wound dimensions minimally improved.   Epifix covered by insurance.   No s/o infection.     Pt noncompliant about leg elevation / low salt diet.       Review of Systems  Negative except HPI   Denies chest pain / SOB / palpitations  Denies fever.     Allergies  No Known Allergies    Current Meds:  Current Outpatient Medications   Medication Sig Dispense Refill    losartan 25 MG Oral Tab Take 40 mg by mouth daily.      Solifenacin Succinate 5 MG Oral Tab Take 1 tablet (5 mg total) by mouth daily.      telmisartan 40 MG Oral Tab Take 1 tablet (40 mg total) by mouth daily.      cephalexin 500 MG Oral Cap Take 1 capsule (500 mg total) by mouth 2 (two) times daily. (Patient not taking: Reported on 5/8/2024)      naproxen 500 MG Oral Tab Take 1 tablet (500 mg total) by mouth 2 (two) times daily as needed. 20 tablet 0    metoprolol succinate 12.5 mg Oral Tab Take 1 Partial Tablet (12.5 mg total) by mouth Daily Beta Blocker.      Levothyroxine Sodium 100 MCG Oral Tab Take 1 tablet (100 mcg total) by mouth before breakfast.      atorvastatin 10 MG Oral Tab Take 1 tablet (10 mg total) by mouth nightly.      Meclizine HCl 25 MG Oral Tab Take 1 tablet (25 mg total) by mouth 3 (three) times daily as needed. 20 tablet 0         EXAM:   Objective   Objective    Physical Exam    Vital Signs  Vitals:    06/12/24 0700   BP: 117/67   Pulse: 52   Resp: 16   Temp: 98 °F (36.7 °C)       Wound Assessment  Wound 05/08/24 #1- Left Medial Ankle Ankle Left (Active)   Wound Image   06/12/24 0911   Drainage Amount Small 06/12/24 0911   Drainage Description Serosanguineous 06/12/24 0911    Treatments Compression 06/05/24 0912   Wound Length (cm) 0.8 cm 06/12/24 0911   Wound Width (cm) 1.1 cm 06/12/24 0911   Wound Surface Area (cm^2) 0.88 cm^2 06/12/24 0911   Wound Depth (cm) 0.1 cm 06/12/24 0911   Wound Volume (cm^3) 0.088 cm^3 06/12/24 0911   Wound Healing % 94 06/12/24 0911   Margins Well-defined edges 06/12/24 0911   Non-staged Wound Description Full thickness 06/12/24 0911   Meagan-wound Assessment Edema;Hemosiderin staining;Dry 06/12/24 0911   Wound Granulation Tissue Spongy;Pink;Red 06/05/24 0912   Wound Bed Granulation (%) 50 % 06/05/24 0912   Wound Bed Epithelium (%) 25 % 06/12/24 0911   Wound Bed Slough (%) 75 % 06/12/24 0911   Wound Odor None 06/12/24 0911   Shape Bridged 06/12/24 0911   Tunneling? No 06/05/24 0912   Undermining? No 06/05/24 0912   Sinus Tracts? No 06/05/24 0912       Compression Wrap 05/08/24 Ankle Anterior;Left (Active)   Response to Treatment Well tolerated 06/05/24 0933   Compression Layers Multilayer 06/05/24 0933   Compression Product Type Unna Boot 06/05/24 0933   Dressing Applied Yes 06/05/24 0933   Compression Wrap Location Toes to Knee 06/05/24 0933   Compression Wrap Status Dry;Clean 06/05/24 0933           ASSESSMENT AND PLAN:     Assessment   Assessment    Encounter Diagnosis  1. Chronic venous hypertension with ulcer and inflammation, left (HCC)    2. 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 Sloughing of wound    4. Edema of left lower leg due to peripheral venous insufficiency        MANAGEMENT    Start epifix dhacm skin substitute  Continue compression wraps  Elevate legs  Low salt diet.   Return one week.   Extensive d/w son reg: plan of care.     PROCEDURES:    Cellular tissue product application Venous Ulcer Left Ankle     Date/Time: 6/12/2024 9:40 AM     Performed by: Marce Woodson MD  Authorized by: Marce Woodson MD  Associated wounds:   Wound 05/08/24 #1- Left Medial Ankle  Ankle Left  Consent:     Consent obtained:  Verbal    Consent given by:  Patient  Anesthesia (see MAR for exact dosages):     Anesthesia method:  Topical application  Procedure details:     Location:  trunk/arms/legs    Product applied:  Epifix    Product lot #:  [id number]    Product expiration:  11/1/2028    Amount used (cm^2):  2    Amount wasted (cm^2):  0    Secured/Fixated: Yes      Secured/Fixated with:  Contact layer, hydrofera transfer, abd pad  Post-procedure details:     Patient tolerance of procedure:  Tolerated well, no immediate complications      MEDICAL NECESSITY FOR SKIN SUBS.     Despite optimal treatment: management of co-morbidities DM and OM, edema control, off-loading, nutritional optimization with MVI and supplements, control of infection, and gold standard wound treatment wound continues to show delayed healing not meeting healing goals (10% current wound size per week).   To avoid further hospitalizations, infections, and loss of work days, this application is being performed in anticipation of staged, related and further necessary applications in attempt to achieve rapid wound closure.  Affixed by Non-Adherent, steri-strips and further secured with secondary dressing.  Amount of product wasted: none.  Patient tolerated procedure well without any complications, was instructed to leave non-adherent in place until next appointment and keep it dry.  Follow-up in 7 days.    Patient Instructions   Please return:  June 19 with Dr. Christianson   June 26 with Anay    Patient discharge and wound care instructions  Raissa Victoriakeylasobia  6/5/2024    You may shower with protection of the wound (ie a cast cover or similar).     Cleansing/dressing:       In clinic/home health care, with each dressing change:    please cleanse the limb, foot, and between toes with soap, water and washcloth.   Dry thoroughly.    Cleanse/soak the wound with VASHE (or other hypochlorous wound cleanser), dab dry.    Apply  the following dressings:   Epifix / contact layer / HF transfer / 20-30mmhg calamine compression with rosadil padding on anterior ankle and anterior tibia    Managing your edema:  Avoid prolonged standing in one place. It is better to have your calf muscles moving to pump fluid out of the legs      Elevate leg(s) above the level of the heart when sitting or as much as possible.  Take you diuretics as directed by your physician. Do not skip doses or change doses unless instructed to do so by your physician.  Decrease salt intake, follow recommended 2 grams daily.  Do not get leg(s) with compression wrap wet. If wraps are too tight as indicated by pain, numbness/tingling or discoloration of toes remove wrap completely and call the wound center @ [phone].  Refer to the \"Multi-layer compression bandage application patient information sheet\" given to you in clinic.     What Are Compression Wraps?   Compression wraps are elastic bandages that wrap around a part of your body to keep swelling down. The wraps create pressure which pushes extra fluid out of a certain area. This improves blood flow to that part of the body and helps it heal faster.   Compression wraps come in different styles. Your doctor will recommend the best compression wrap for your needs.  How Do I Take Care of a Compression Wrap?   Compression wraps can be worn for up to seven days if you take good care of them. Here's how to make them last and keep them working right:   Keep them clean and dry until your next doctor's appointment.   Wear thin, stretchable stockings over the wrap to hold it in place. This keeps the wrap from sticking to your sheets while sleeping. It also keeps your clothing from sticking to the wrap.   Wear shoes that can fit comfortably around a wrap that covers your leg and foot.       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 Patient has been compliant  with 30-40 mmHg compression bandaging, elevation, and exercise over the past 4 weeks, but still presents with significant swelling and hyperpigmentation and ulceration.      Nutrition and blood sugar control:  Focus on the following:  Protein: Meats, beans, eggs, milk and yogurt particularly Greek yogurt), tofu, soy nuts, soy protein products (Follow the protein handout in your welcome folder)  Vitamin C: Citrus fruits and juices, strawberries, tomatoes, tomato juice, peppers, baked potatoes, spinach, broccoli, cauliflower, [place] sprouts, cabbage  Vitamin A: Dark green, leafy vegetables, orange or yellow vegetables, cantaloupe, fortified dairy products, liver, fortified cereals  Zinc: Fortified cereals, red meats, seafood  Consider supplementing with Dao by [employer]. It can be purchased on amazon, [employer] website, or local pharmacy may be able to order it for you.  (These are essential branch chain amino acids that help with tissue building and wound healing).     Concerns:  Signs of infection may include the following:  Increase in redness  Red \"streaks\" from wound  Increase in swelling  Fever  Unusual odor  Change in the amount of wound drainage     Should you experience any significant changes in your wound(s) or have any questions regarding your home care instructions please contact the wound center [facility] @ [phone] If after regular business hours, please call your family doctor or local emergency room. The treatment plan has been discussed at length between you and your provider. Follow all instructions carefully, it is very important. If you do not follow all instructions you are at risk of your wound not healing, infection, possible loss of limb and even loss of life.     Orders  Orders Placed This Encounter   Procedures    Cellular tissue product application Venous Ulcer Left Ankle     Patient/Caregiver Education: There are no barriers to learning. Medical education for above diagnosis  given.   Answered all questions.    Outcome: Patient verbalizes understanding. Patient is notified to call with any questions, complications, allergies, or worsening or changing symptoms.  Patient is to call with any side effects or complications as a result of the treatments today.      DOCUMENTATION OF TIME SPENT: Code selection for this visit was based on time spent : 45 min on date of service in preparing to see the patient, obtaining and/or reviewing separately obtained history, performing a medically appropriate examination, counseling and educating the patient/family/caregiver, ordering medications or testing, referring and communicating with other healthcare providers, documenting clinical information in the E HR, independently interpreting results and communicating results to the patient/family/caregiver and care coordination with the patient's other providers.    Followup: Return in 1 week (on 6/19/2024) for Wound followup.      Note to Patient:  The 21st Century Cures Act makes medical notes like these available to patients in the interest of transparency. However, be advised this is a medical document and is intended as [id number]-[id number] communication; it is written in medical language and may appear blunt, direct, or contain abbreviations or verbiage that are unfamiliar. Medical documents are intended to carry relevant information, facts as evident, and the clinical opinion of the practitioner.    Also, please note that this report has been produced using speech recognition software and may contain errors related to that system including, but not limited to, errors in grammar, punctuation, and spelling, as well as words and phrases that possibly may have been recognized inappropriately.  If there are any questions or concerns, contact the dictating provider for clarification.      Marce Christianson MD  6/12/2024  9:18 AM

## 2024-06-12 NOTE — PATIENT INSTRUCTIONS
Please return:  June 19 with Dr. Christianson   June 26 with Anay    Patient discharge and wound care instructions  Raissa Pate  6/5/2024    You may shower with protection of the wound (ie a cast cover or similar).     Cleansing/dressing:       In clinic/home health care, with each dressing change:    please cleanse the limb, foot, and between toes with soap, water and washcloth.   Dry thoroughly.    Cleanse/soak the wound with VASHE (or other hypochlorous wound cleanser), dab dry.    Apply the following dressings:   Epifix / contact layer / HF transfer / 20-30mmhg calamine compression with rosadil padding on anterior ankle and anterior tibia    Managing your edema:  Avoid prolonged standing in one place. It is better to have your calf muscles moving to pump fluid out of the legs      Elevate leg(s) above the level of the heart when sitting or as much as possible.  Take you diuretics as directed by your physician. Do not skip doses or change doses unless instructed to do so by your physician.  Decrease salt intake, follow recommended 2 grams daily.  Do not get leg(s) with compression wrap wet. If wraps are too tight as indicated by pain, numbness/tingling or discoloration of toes remove wrap completely and call the wound center @ [phone].  Refer to the \"Multi-layer compression bandage application patient information sheet\" given to you in clinic.     What Are Compression Wraps?   Compression wraps are elastic bandages that wrap around a part of your body to keep swelling down. The wraps create pressure which pushes extra fluid out of a certain area. This improves blood flow to that part of the body and helps it heal faster.   Compression wraps come in different styles. Your doctor will recommend the best compression wrap for your needs.  How Do I Take Care of a Compression Wrap?   Compression wraps can be worn for up to seven days if you take good care of them. Here's how to make them last and keep them  working right:   Keep them clean and dry until your next doctor's appointment.   Wear thin, stretchable stockings over the wrap to hold it in place. This keeps the wrap from sticking to your sheets while sleeping. It also keeps your clothing from sticking to the wrap.   Wear shoes that can fit comfortably around a wrap that covers your leg and foot.       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 Patient has been compliant with 30-40 mmHg compression bandaging, elevation, and exercise over the past 4 weeks, but still presents with significant swelling and hyperpigmentation and ulceration.      Nutrition and blood sugar control:  Focus on the following:  Protein: Meats, beans, eggs, milk and yogurt particularly Greek yogurt), tofu, soy nuts, soy protein products (Follow the protein handout in your welcome folder)  Vitamin C: Citrus fruits and juices, strawberries, tomatoes, tomato juice, peppers, baked potatoes, spinach, broccoli, cauliflower, [place] sprouts, cabbage  Vitamin A: Dark green, leafy vegetables, orange or yellow vegetables, cantaloupe, fortified dairy products, liver, fortified cereals  Zinc: Fortified cereals, red meats, seafood  Consider supplementing with Dao by [employer]. It can be purchased on amazon, [employer] website, or local pharmacy may be able to order it for you.  (These are essential branch chain amino acids that help with tissue building and wound healing).     Concerns:  Signs of infection may include the following:  Increase in redness  Red \"streaks\" from wound  Increase in swelling  Fever  Unusual odor  Change in the amount of wound drainage     Should you experience any significant changes in your wound(s) or have any questions regarding your home care instructions please contact the wound center [facility] @ [phone] If after regular business hours, please call your family doctor or local emergency  room. The treatment plan has been discussed at length between you and your provider. Follow all instructions carefully, it is very important. If you do not follow all instructions you are at risk of your wound not healing, infection, possible loss of limb and even loss of life.

## 2024-06-12 NOTE — PROGRESS NOTES
.Weekly Wound Education Note    Teaching Provided To: Patient;Family  Training Topics: Compression;Discharge instructions;Dressing;Edema control;Skin substitute  Training Method: Explain/Verbal;Written  Training Response: Patient responds and understands;Reinforcement needed            First application of Epifix, covered with contact layer, hydrofera transfer, abd pad.  Calamine unna boot 20-30mmHg, rosidol to anterior leg for protection.

## 2024-06-19 ENCOUNTER — TELEPHONE (OUTPATIENT)
Dept: CARDIOLOGY | Age: 84
End: 2024-06-19

## 2024-06-19 ENCOUNTER — OFFICE VISIT (OUTPATIENT)
Dept: WOUND CARE | Facility: HOSPITAL | Age: 84
End: 2024-06-19
Attending: NURSE PRACTITIONER

## 2024-06-19 ENCOUNTER — OFFICE VISIT (OUTPATIENT)
Dept: CARDIOLOGY | Age: 84
End: 2024-06-19

## 2024-06-19 VITALS
WEIGHT: 141.4 LBS | HEIGHT: 58 IN | HEART RATE: 75 BPM | RESPIRATION RATE: 18 BRPM | SYSTOLIC BLOOD PRESSURE: 112 MMHG | DIASTOLIC BLOOD PRESSURE: 68 MMHG | OXYGEN SATURATION: 98 % | BODY MASS INDEX: 29.68 KG/M2

## 2024-06-19 VITALS
HEART RATE: 52 BPM | TEMPERATURE: 97 F | DIASTOLIC BLOOD PRESSURE: 81 MMHG | RESPIRATION RATE: 16 BRPM | SYSTOLIC BLOOD PRESSURE: 139 MMHG

## 2024-06-19 DIAGNOSIS — I50.32 CHRONIC DIASTOLIC (CONGESTIVE) HEART FAILURE  (CMD): ICD-10-CM

## 2024-06-19 DIAGNOSIS — L97.325 NON-PRESSURE CHRONIC ULCER OF LEFT ANKLE WITH MUSCLE INVOLVEMENT WITHOUT EVIDENCE OF NECROSIS (HCC): ICD-10-CM

## 2024-06-19 DIAGNOSIS — I87.332 CHRONIC VENOUS HYPERTENSION WITH ULCER AND INFLAMMATION, LEFT (HCC): Primary | ICD-10-CM

## 2024-06-19 DIAGNOSIS — E78.2 MIXED HYPERLIPIDEMIA: ICD-10-CM

## 2024-06-19 DIAGNOSIS — R23.8 SLOUGHING OF WOUND: ICD-10-CM

## 2024-06-19 DIAGNOSIS — L03.116 CELLULITIS OF LEFT LOWER EXTREMITY: ICD-10-CM

## 2024-06-19 DIAGNOSIS — I86.8 VARICOSE VEINS OF OTHER SPECIFIED SITES: ICD-10-CM

## 2024-06-19 DIAGNOSIS — I49.3 FREQUENT PVCS: Primary | ICD-10-CM

## 2024-06-19 DIAGNOSIS — I87.2 EDEMA OF LEFT LOWER LEG DUE TO PERIPHERAL VENOUS INSUFFICIENCY: ICD-10-CM

## 2024-06-19 DIAGNOSIS — E03.9 HYPOTHYROIDISM, UNSPECIFIED TYPE: ICD-10-CM

## 2024-06-19 DIAGNOSIS — I83.893 VARICOSE VEINS OF BOTH LOWER EXTREMITIES WITH COMPLICATIONS: Primary | ICD-10-CM

## 2024-06-19 DIAGNOSIS — I83.892 VARICOSE VEINS OF LEFT LOWER EXTREMITY WITH COMPLICATIONS: ICD-10-CM

## 2024-06-19 DIAGNOSIS — R60.0 EDEMA OF LEFT LOWER LEG DUE TO PERIPHERAL VENOUS INSUFFICIENCY: ICD-10-CM

## 2024-06-19 PROCEDURE — 29581 APPL MULTLAYER CMPRN SYS LEG: CPT

## 2024-06-19 PROCEDURE — 93227 XTRNL ECG REC<48 HR R&I: CPT | Performed by: INTERNAL MEDICINE

## 2024-06-19 RX ORDER — METOPROLOL SUCCINATE 25 MG/1
25 TABLET, EXTENDED RELEASE ORAL DAILY
Qty: 90 TABLET | Refills: 1 | Status: SHIPPED | OUTPATIENT
Start: 2024-06-19 | End: 2024-07-03 | Stop reason: DRUGHIGH

## 2024-06-19 RX ORDER — FLECAINIDE ACETATE 50 MG/1
50 TABLET ORAL 2 TIMES DAILY
Qty: 180 TABLET | Refills: 1 | Status: SHIPPED | OUTPATIENT
Start: 2024-06-19

## 2024-06-19 ASSESSMENT — PATIENT HEALTH QUESTIONNAIRE - PHQ9
1. LITTLE INTEREST OR PLEASURE IN DOING THINGS: NOT AT ALL
SUM OF ALL RESPONSES TO PHQ9 QUESTIONS 1 AND 2: 0
2. FEELING DOWN, DEPRESSED OR HOPELESS: NOT AT ALL
SUM OF ALL RESPONSES TO PHQ9 QUESTIONS 1 AND 2: 0
CLINICAL INTERPRETATION OF PHQ2 SCORE: NO FURTHER SCREENING NEEDED

## 2024-06-19 NOTE — PROGRESS NOTES
.Weekly Wound Education Note    Teaching Provided To: Patient;Family  Training Topics: Discharge instructions;Dressing;Compression;Edema control  Training Method: Explain/Verbal;Written  Training Response: Reinforcement needed;Patient responds and understands            Skin substitute remains securely in place, covered with folded xeroform.  Betamethasone to leg.  Continue calamine unna boot 20-30mmHg with rosidol over anterior leg for protection.

## 2024-06-19 NOTE — PATIENT INSTRUCTIONS
Please return: June 26 with Anay    Patient discharge and wound care instructions  Raissa Pate    You may shower with protection of the wound (ie a cast cover or similar).     Cleansing/dressing:       In clinic/home health care, with each dressing change:    please cleanse the limb, foot, and between toes with soap, water and washcloth.   Dry thoroughly.    Cleanse/soak the wound with VASHE (or other hypochlorous wound cleanser), dab dry.    Apply the following dressings:   Epifix in place / contact layer / HF transfer / 20-30mmhg calamine compression with rosadil padding on anterior ankle and anterior tibia    Managing your edema:  Avoid prolonged standing in one place. It is better to have your calf muscles moving to pump fluid out of the legs      Elevate leg(s) above the level of the heart when sitting or as much as possible.  Take you diuretics as directed by your physician. Do not skip doses or change doses unless instructed to do so by your physician.  Decrease salt intake, follow recommended 2 grams daily.  Do not get leg(s) with compression wrap wet. If wraps are too tight as indicated by pain, numbness/tingling or discoloration of toes remove wrap completely and call the wound center @ [phone].  Refer to the \"Multi-layer compression bandage application patient information sheet\" given to you in clinic.     What Are Compression Wraps?   Compression wraps are elastic bandages that wrap around a part of your body to keep swelling down. The wraps create pressure which pushes extra fluid out of a certain area. This improves blood flow to that part of the body and helps it heal faster.   Compression wraps come in different styles. Your doctor will recommend the best compression wrap for your needs.  How Do I Take Care of a Compression Wrap?   Compression wraps can be worn for up to seven days if you take good care of them. Here's how to make them last and keep them working right:   Keep them clean  and dry until your next doctor's appointment.   Wear thin, stretchable stockings over the wrap to hold it in place. This keeps the wrap from sticking to your sheets while sleeping. It also keeps your clothing from sticking to the wrap.   Wear shoes that can fit comfortably around a wrap that covers your leg and foot.       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 Patient has been compliant with 30-40 mmHg compression bandaging, elevation, and exercise over the past 4 weeks, but still presents with significant swelling and hyperpigmentation and ulceration.      Nutrition and blood sugar control:  Focus on the following:  Protein: Meats, beans, eggs, milk and yogurt particularly Greek yogurt), tofu, soy nuts, soy protein products (Follow the protein handout in your welcome folder)  Vitamin C: Citrus fruits and juices, strawberries, tomatoes, tomato juice, peppers, baked potatoes, spinach, broccoli, cauliflower, [place] sprouts, cabbage  Vitamin A: Dark green, leafy vegetables, orange or yellow vegetables, cantaloupe, fortified dairy products, liver, fortified cereals  Zinc: Fortified cereals, red meats, seafood  Consider supplementing with Dao by [employer]. It can be purchased on amazon, [employer] website, or local pharmacy may be able to order it for you.  (These are essential branch chain amino acids that help with tissue building and wound healing).     Concerns:  Signs of infection may include the following:  Increase in redness  Red \"streaks\" from wound  Increase in swelling  Fever  Unusual odor  Change in the amount of wound drainage     Should you experience any significant changes in your wound(s) or have any questions regarding your home care instructions please contact the [facility] center [facility] @ [phone] If after regular business hours, please call your family doctor or local emergency room. The treatment plan has been  discussed at length between you and your provider. Follow all instructions carefully, it is very important. If you do not follow all instructions you are at risk of your wound not healing, infection, possible loss of limb and even loss of life.

## 2024-06-19 NOTE — PROGRESS NOTES
[facility] PROGRESS NOTE  TERENCE OCONNOR MD  6/19/2024    Chief Complaint:   Chief Complaint   Patient presents with    Wound Care     Patient is here for a wound care follow up. Her pain has improved and is currently 2/10.        HPI:   Subjective   Raissa Pate is a 83 year old female coming in for a follow-up visit.    HPI    Wound seems closed-- epifix fully integrated - unclear if there is small area open underneath.       Review of Systems  Negative except HPI   Denies chest pain / SOB / palpitations  Denies fever.     Allergies  No Known Allergies    Current Meds:  Current Outpatient Medications   Medication Sig Dispense Refill    losartan 25 MG Oral Tab Take 40 mg by mouth daily.      Solifenacin Succinate 5 MG Oral Tab Take 1 tablet (5 mg total) by mouth daily.      telmisartan 40 MG Oral Tab Take 1 tablet (40 mg total) by mouth daily.      cephalexin 500 MG Oral Cap Take 1 capsule (500 mg total) by mouth 2 (two) times daily. (Patient not taking: Reported on 5/8/2024)      naproxen 500 MG Oral Tab Take 1 tablet (500 mg total) by mouth 2 (two) times daily as needed. 20 tablet 0    metoprolol succinate 12.5 mg Oral Tab Take 1 Partial Tablet (12.5 mg total) by mouth Daily Beta Blocker.      Levothyroxine Sodium 100 MCG Oral Tab Take 1 tablet (100 mcg total) by mouth before breakfast.      atorvastatin 10 MG Oral Tab Take 1 tablet (10 mg total) by mouth nightly.      Meclizine HCl 25 MG Oral Tab Take 1 tablet (25 mg total) by mouth 3 (three) times daily as needed. 20 tablet 0         EXAM:   Objective   Objective    Physical Exam    Vital Signs  Vitals:    06/19/24 0836   BP: 139/81   Pulse: 52   Resp: 16   Temp: 97.3 °F (36.3 °C)       Wound Assessment  Wound 05/08/24 #1- Left Medial Ankle Ankle Left (Active)   Wound Image   06/19/24 0840   Drainage Amount Scant 06/19/24 0840   Drainage Description Serosanguineous 06/19/24 0840   Treatments Compression 06/19/24 0840   Wound Length (cm) 0.5 cm  06/19/24 0840   Wound Width (cm) 0.6 cm 06/19/24 0840   Wound Surface Area (cm^2) 0.3 cm^2 06/19/24 0840   Wound Depth (cm) 0 cm 06/19/24 0840   Wound Volume (cm^3) 0 cm^3 06/19/24 0840   Wound Healing % 100 06/19/24 0840   Margins Well-defined edges 06/19/24 0840   Non-staged Wound Description Full thickness 06/19/24 0840   Meagan-wound Assessment Edema;Hemosiderin staining;Dry 06/19/24 0840   Wound Granulation Tissue Spongy;Pink;Red 06/05/24 0912   Wound Bed Granulation (%) 50 % 06/05/24 0912   Wound Bed Epithelium (%) 25 % 06/12/24 0911   Wound Bed Slough (%) 75 % 06/12/24 0911   Wound Odor None 06/19/24 0840   Shape Scabbed, unable to view wound bed. 06/19/24 0840   Tunneling? No 06/19/24 0840   Undermining? No 06/19/24 0840   Sinus Tracts? No 06/19/24 0840       Compression Wrap 05/08/24 Ankle Anterior;Left (Active)   Response to Treatment Well tolerated 06/19/24 0916   Compression Layers Multilayer 06/19/24 0916   Compression Product Type Unna Boot;Comprilan 12cm 06/19/24 0916   Dressing Applied Yes 06/19/24 0916   Compression Wrap Location Toes to Knee 06/19/24 0916   Compression Wrap Status Clean;Dry 06/19/24 0916           ASSESSMENT AND PLAN:     Assessment   Assessment    Encounter Diagnosis  1. Chronic venous hypertension with ulcer and inflammation, left (HCC)    2. 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 Sloughing of wound    4. Edema of left lower leg due to peripheral venous insufficiency        Problem List  There is no problem list on file for this patient.        MANAGEMENT    Leave epifix in place.   Apply compression wrap over it.   Low salt diet  Leg elevation.   Plan of care d/w son in detail  Return one week.     PROCEDURES:    Compression wrap applied after dressing change.     Patient Instructions   Please return: June 26 with Anay    Patient discharge and wound care instructions  Raissa Pate    You may shower with  protection of the wound (ie a cast cover or similar).     Cleansing/dressing:       In clinic/home health care, with each dressing change:    please cleanse the limb, foot, and between toes with soap, water and washcloth.   Dry thoroughly.    Cleanse/soak the wound with VASHE (or other hypochlorous wound cleanser), dab dry.    Apply the following dressings:   Epifix in place / contact layer / HF transfer / 20-30mmhg calamine compression with rosadil padding on anterior ankle and anterior tibia    Managing your edema:  Avoid prolonged standing in one place. It is better to have your calf muscles moving to pump fluid out of the legs      Elevate leg(s) above the level of the heart when sitting or as much as possible.  Take you diuretics as directed by your physician. Do not skip doses or change doses unless instructed to do so by your physician.  Decrease salt intake, follow recommended 2 grams daily.  Do not get leg(s) with compression wrap wet. If wraps are too tight as indicated by pain, numbness/tingling or discoloration of toes remove wrap completely and call the wound center @ [phone].  Refer to the \"Multi-layer compression bandage application patient information sheet\" given to you in clinic.     What Are Compression Wraps?   Compression wraps are elastic bandages that wrap around a part of your body to keep swelling down. The wraps create pressure which pushes extra fluid out of a certain area. This improves blood flow to that part of the body and helps it heal faster.   Compression wraps come in different styles. Your doctor will recommend the best compression wrap for your needs.  How Do I Take Care of a Compression Wrap?   Compression wraps can be worn for up to seven days if you take good care of them. Here's how to make them last and keep them working right:   Keep them clean and dry until your next doctor's appointment.   Wear thin, stretchable stockings over the wrap to hold it in place. This keeps  the wrap from sticking to your sheets while sleeping. It also keeps your clothing from sticking to the wrap.   Wear shoes that can fit comfortably around a wrap that covers your leg and foot.       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 Patient has been compliant with 30-40 mmHg compression bandaging, elevation, and exercise over the past 4 weeks, but still presents with significant swelling and hyperpigmentation and ulceration.      Nutrition and blood sugar control:  Focus on the following:  Protein: Meats, beans, eggs, milk and yogurt particularly Greek yogurt), tofu, soy nuts, soy protein products (Follow the protein handout in your welcome folder)  Vitamin C: Citrus fruits and juices, strawberries, tomatoes, tomato juice, peppers, baked potatoes, spinach, broccoli, cauliflower, [place] sprouts, cabbage  Vitamin A: Dark green, leafy vegetables, orange or yellow vegetables, cantaloupe, fortified dairy products, liver, fortified cereals  Zinc: Fortified cereals, red meats, seafood  Consider supplementing with Dao by [employer]. It can be purchased on amazon, [employer] website, or local pharmacy may be able to order it for you.  (These are essential branch chain amino acids that help with tissue building and wound healing).     Concerns:  Signs of infection may include the following:  Increase in redness  Red \"streaks\" from wound  Increase in swelling  Fever  Unusual odor  Change in the amount of wound drainage     Should you experience any significant changes in your wound(s) or have any questions regarding your home care instructions please contact the [facility] @ [phone] If after regular business hours, please call your family doctor or local emergency room. The treatment plan has been discussed at length between you and your provider. Follow all instructions carefully, it is very important. If you do not  follow all instructions you are at risk of your wound not healing, infection, possible loss of limb and even loss of life.       Patient/Caregiver Education: There are no barriers to learning. Medical education for above diagnosis given.   Answered all questions.    Outcome: Patient verbalizes understanding. Patient is notified to call with any questions, complications, allergies, or worsening or changing symptoms.  Patient is to call with any side effects or complications as a result of the treatments today.      DOCUMENTATION OF TIME SPENT: Code selection for this visit was based on time spent : 30 min on date of service in preparing to see the patient, obtaining and/or reviewing separately obtained history, performing a medically appropriate examination, counseling and educating the patient/family/caregiver, ordering medications or testing, referring and communicating with other healthcare providers, documenting clinical information in the E HR, independently interpreting results and communicating results to the patient/family/caregiver and care coordination with the patient's other providers.    Followup: Return in about 1 week (around 6/26/2024) for Wound followup.      Note to Patient:  The 21st Century Cures Act makes medical notes like these available to patients in the interest of transparency. However, be advised this is a medical document and is intended as [id number] communication; it is written in medical language and may appear blunt, direct, or contain abbreviations or verbiage that are unfamiliar. Medical documents are intended to carry relevant information, facts as evident, and the clinical opinion of the practitioner.    Also, please note that this report has been produced using speech recognition software and may contain errors related to that system including, but not limited to, errors in grammar, punctuation, and spelling, as well as words and phrases that possibly may have been recognized  inappropriately.  If there are any questions or concerns, contact the dictating provider for clarification.      Marce Christianson MD  6/19/2024  10:12 AM

## 2024-06-20 ENCOUNTER — TELEPHONE (OUTPATIENT)
Dept: PSYCHOLOGY | Age: 84
End: 2024-06-20

## 2024-06-20 ENCOUNTER — LAB SERVICES (OUTPATIENT)
Dept: LAB | Age: 84
End: 2024-06-20

## 2024-06-20 DIAGNOSIS — E78.2 MIXED HYPERLIPIDEMIA: ICD-10-CM

## 2024-06-20 DIAGNOSIS — E03.9 HYPOTHYROIDISM, UNSPECIFIED TYPE: ICD-10-CM

## 2024-06-20 DIAGNOSIS — I83.892 VARICOSE VEINS OF LEFT LOWER EXTREMITY WITH COMPLICATIONS: ICD-10-CM

## 2024-06-20 DIAGNOSIS — L03.116 CELLULITIS OF LEFT LOWER EXTREMITY: ICD-10-CM

## 2024-06-20 DIAGNOSIS — I50.32 CHRONIC DIASTOLIC (CONGESTIVE) HEART FAILURE  (CMD): ICD-10-CM

## 2024-06-20 DIAGNOSIS — I49.3 FREQUENT PVCS: ICD-10-CM

## 2024-06-20 LAB
ALBUMIN SERPL-MCNC: 3.8 G/DL (ref 3.6–5.1)
ALBUMIN/GLOB SERPL: 0.9 {RATIO} (ref 1–2.4)
ALP SERPL-CCNC: 50 UNITS/L (ref 45–117)
ALT SERPL-CCNC: 23 UNITS/L
ANION GAP SERPL CALC-SCNC: 13 MMOL/L (ref 7–19)
AST SERPL-CCNC: 12 UNITS/L
BILIRUB SERPL-MCNC: 0.5 MG/DL (ref 0.2–1)
BUN SERPL-MCNC: 47 MG/DL (ref 6–20)
BUN/CREAT SERPL: 40 (ref 7–25)
CALCIUM SERPL-MCNC: 9.5 MG/DL (ref 8.4–10.2)
CHLORIDE SERPL-SCNC: 103 MMOL/L (ref 97–110)
CHOLEST SERPL-MCNC: 162 MG/DL
CHOLEST/HDLC SERPL: 2.9 {RATIO}
CO2 SERPL-SCNC: 26 MMOL/L (ref 21–32)
CREAT SERPL-MCNC: 1.17 MG/DL (ref 0.51–0.95)
EGFRCR SERPLBLD CKD-EPI 2021: 47 ML/MIN/{1.73_M2}
FASTING DURATION TIME PATIENT: 12 HOURS (ref 0–999)
GLOBULIN SER-MCNC: 4.1 G/DL (ref 2–4)
GLUCOSE SERPL-MCNC: 121 MG/DL (ref 70–99)
HDLC SERPL-MCNC: 55 MG/DL
LDLC SERPL CALC-MCNC: 94 MG/DL
MAGNESIUM SERPL-MCNC: 2.3 MG/DL (ref 1.7–2.4)
NONHDLC SERPL-MCNC: 107 MG/DL
NT-PROBNP SERPL-MCNC: 166 PG/ML
POTASSIUM SERPL-SCNC: 5.6 MMOL/L (ref 3.4–5.1)
PROT SERPL-MCNC: 7.9 G/DL (ref 6.4–8.2)
SODIUM SERPL-SCNC: 136 MMOL/L (ref 135–145)
T4 FREE SERPL-MCNC: 0.8 NG/DL (ref 0.8–1.5)
TRIGL SERPL-MCNC: 64 MG/DL
TSH SERPL-ACNC: 7.25 MCUNITS/ML (ref 0.35–5)

## 2024-06-20 PROCEDURE — 83880 ASSAY OF NATRIURETIC PEPTIDE: CPT | Performed by: INTERNAL MEDICINE

## 2024-06-20 PROCEDURE — 80061 LIPID PANEL: CPT | Performed by: INTERNAL MEDICINE

## 2024-06-20 PROCEDURE — 84443 ASSAY THYROID STIM HORMONE: CPT | Performed by: INTERNAL MEDICINE

## 2024-06-20 PROCEDURE — 83695 ASSAY OF LIPOPROTEIN(A): CPT | Performed by: CLINICAL MEDICAL LABORATORY

## 2024-06-20 PROCEDURE — 80053 COMPREHEN METABOLIC PANEL: CPT | Performed by: INTERNAL MEDICINE

## 2024-06-20 PROCEDURE — 36415 COLL VENOUS BLD VENIPUNCTURE: CPT | Performed by: INTERNAL MEDICINE

## 2024-06-20 PROCEDURE — 83735 ASSAY OF MAGNESIUM: CPT | Performed by: INTERNAL MEDICINE

## 2024-06-20 PROCEDURE — 84439 ASSAY OF FREE THYROXINE: CPT | Performed by: INTERNAL MEDICINE

## 2024-06-22 LAB — LPA SERPL-MCNC: 69 MG/DL

## 2024-06-25 NOTE — PROGRESS NOTES
CHIEF COMPLAINT:     Chief Complaint   Patient presents with    Wound Care     Follow up for left ankle wound. No complaints at this time.      HPI:   Information obtained from patient and chart  5-8-24 INITIAL  84 yo with pmhx of htn, high cholesterol, venous insufficiency with ulceration.  She presented to [facility] with left foot ulceration, patient was started on Keflex, which patient completed today.  Patient is here today with her son. She has been utilizing neosporin to the wound.  Her recent esr was wnl. Son reports that patient had ulcerations many years ago, and then when she went to [facility] for many years they healed.  She returned here to the [facility] and is living with her son and has redeveloped the ulceration. He states she sleeps in a bed, but spends a lot of time in the kitchen at the counter even though they advise her to sit and elevate her legs.  She has scarring and hypopigmentation noted on the left leg and venous changes bilaterally.  They have seen a Dr. Sinclair (son is not sure what organization md is with) and they will be doing a vein mapping at the end of may.  We discussed that the ulceration is directly over the malleloous. Will get xray. Will start compression and advanced wound dressings. Today there is not any s/s of infection. Patient does have pain with movement and touch to the area. But no visible movement in the wound bed.    5-15-24 patient returns, she did get xray done, patient with mild soft tissue swelling in hindfoot and small heel spurs noted. Patient came  for rn visit late last week. Edema not significantly reduced, wound is improved and I was able to debride and get it clean enough to transition to collagen. We also discussed compression pumps and they are interested, will start that process. No s/s of infection.    5-22-24 patient returns, last week we debrided the wound and transitioned to collagen.  They trialed the pumps yesterday and patient liked them. Patient also had her  vein mapping done yesterday, they don't have results yet. Their appointment with dr. Sinclair is not until end of June, I encouraged them to maybe move that appointment sooner if able.  Today, the wound is slightly smaller and more granular. Patient c/o pain and itching with manipulation of the area and itching at noc. No acute s/s of infection. Will run insurance for epifix.    5-29-24 patient returns with [facility].  The venous reflux study conclusions are below: patient with severe reflux in the left saphenous and left . Follow-up appointment with vascular remains for later in June.  Wound is improved. Epifix is approved. No s/s of infection. Patient states she has pain at the end of the day. Will place epifix today.    6-5-24 patient returns, last week we placed epifix.  They were unable to get Vascular moved up to earlier date.  The wound is smaller today, there is not a small epifix disc available-will utilize sheila. Patient is c/o pain and itching along medial aspect of anterior tibia.  There is slight redness noted there.  Will decrease the wrap to 20-30mmhg and add padding along anterior tibia. Will also treat itching with betamethasone.  No acute s/s of infection. Patient will f/u with dr. Christianson in my [place] for epifix placement prn    6-26-24 patient returns with [facility].  She has had 2 applications of epifix with good results. Last week it appeared the wound was fragilly resolved, she was kept in compression. She saw cardiology on 6-19 they started flecainide and toprol, also discussed stockings and purusing venaseal/schlerotherapy, patient also referred for memory issues.   Today wound is resolved, will wrap with compression and moist dressing for one more week to allow the tissue to increase in tensile strength.  She has been using her compression pumps.  We will order 2 layer mediven stockings.  Family instructed to bring compression stockings next week and we will transition into  them. No s/s of infection, no c/o pain, just itching.  MEDICATIONS:     Current Outpatient Medications:     losartan 25 MG Oral Tab, Take 40 mg by mouth daily., Disp: , Rfl:     Solifenacin Succinate 5 MG Oral Tab, Take 1 tablet (5 mg total) by mouth daily., Disp: , Rfl:     telmisartan 40 MG Oral Tab, Take 1 tablet (40 mg total) by mouth daily., Disp: , Rfl:     cephalexin 500 MG Oral Cap, Take 1 capsule (500 mg total) by mouth 2 (two) times daily. (Patient not taking: Reported on 5/8/2024), Disp: , Rfl:     naproxen 500 MG Oral Tab, Take 1 tablet (500 mg total) by mouth 2 (two) times daily as needed., Disp: 20 tablet, Rfl: 0    metoprolol succinate 12.5 mg Oral Tab, Take 1 Partial Tablet (12.5 mg total) by mouth Daily Beta Blocker., Disp: , Rfl:     Levothyroxine Sodium 100 MCG Oral Tab, Take 1 tablet (100 mcg total) by mouth before breakfast., Disp: , Rfl:     atorvastatin 10 MG Oral Tab, Take 1 tablet (10 mg total) by mouth nightly., Disp: , Rfl:     Meclizine HCl 25 MG Oral Tab, Take 1 tablet (25 mg total) by mouth 3 (three) times daily as needed., Disp: 20 tablet, Rfl: 0  ALLERGIES:   No Known Allergies   REVIEW OF SYSTEMS:   This information was obtained from the patient/family and chart.    See HPI for pertinent positives, otherwise 10 pt ROS negative.  HISTORY:   Past medical, surgical, family and social history updated where appropriate.    PHYSICAL EXAM:     Vitals:    06/26/24 0700   BP: 123/57   Pulse: (!) 48   Resp: 16   Temp: 98 °F (36.7 °C)         Estimated body mass index is 23.25 kg/m² as calculated from the following:    Height as of 5/8/24: 60\".    Weight as of 5/8/24: 119 lb 0.8 oz (54 kg).   POC Glucose   Date Value Ref Range Status   12/08/2017 128 (H) 65 - 99 mg/dL Final       Vital signs reviewed.Appears stated age, well groomed.    Constitutional:  Bp wnl for patient. Pulse Regular and bradycardic-patient just started new medications with cardiology. Respirations easy and unlabored.  Temperature wnl. Elevated bmi. Appearance neat and clean. Appears in no acute distress. Well nourished and well developed.    Lower extremity:  dp/pt palpable left. Left  lower extremity + for varicosities, +edema stable, +hyperpigmentation and hypopigmentation on the left, scarring noted on the left. Capillary refill < 3 seconds. Digits are warm. toenails are wnl for color, thickness and hygeine. Skin hydration wnl. no hairgrowth on legs.    Musculoskeletal:  Gait and station stable   Integumentary:  refer to wound characteristics and images   Psychiatric:  Judgment and insight intact. Alert and oriented times 3. No evidence of depression, anxiety, or agitation. Calm, cooperative, and communicative.   EDEMA:   Calf  Point of Measurement - Left Calf: 30     Left Calf from:: Heel  Calf Left cm:: 32.9        Ankle  Point of Measurement - Left Ankle: 10     Left Ankle from:: Heel  Left Ankle cm:: 18.9              DIAGNOSTICS:     Lab Results   Component Value Date    BUN 24 (H) 05/02/2024    CREATSERUM 1.12 (H) 05/02/2024    ALB 3.5 05/02/2024    TP 8.1 05/02/2024 5-21-24 venous reflux study-advocate  1. There is no evidence of acute or chronic deep vein or superficial vein  thrombosis in the right lower extremity and left lower extremity where  visualized.  2. There is no evidence of deep vein insufficiency in the right lower  extremity and left lower extremity where visualized.  3. There is no evidence of venous insufficiency in the right anterior  accessory saphenous vein and left anterior accessory saphenous vein.  4. There is no evidence of venous insufficiency in the left small saphenous  vein.  5. Severe reflux in the right great saphenous vein and left great saphenous  vein.  6. Severe reflux in the right small saphenous vein.  7. Severe reflux in the right calf tributaries and left calf tributaries.  8. Severe reflux in the left incompetent .     5-8-24 left ankle xray  CONCLUSION:    Overlying  bandage material obscures fine osseous detail.    No appreciable fracture or traumatic malalignment.  The ankle mortise and joint spaces of the hindfoot are preserved.  Small Achilles and plantar calcaneal spurs noted.  Mild soft tissue swelling diffusely about the hindfoot.  No focal areas of   ulceration identified.     WOUND ASSESSMENT:     Wound 05/08/24 #1- Left Medial Ankle Ankle Left (Active)   Date First Assessed/Time First Assessed: 05/08/24 1421    Wound Number (Wound Clinic Only): #1- Left Medial Ankle  Primary Wound Type: Venous Ulcer  Location: Ankle  Wound Location Orientation: Left      Assessments 5/8/2024  2:22 PM 6/26/2024  8:42 AM   Wound Image       Drainage Amount Moderate None   Drainage Description Serous;Yellow --   Treatments Compression --   Wound Length (cm) 2 cm 0.1 cm   Wound Width (cm) 2.4 cm 0.1 cm   Wound Surface Area (cm^2) 4.8 cm^2 0.01 cm^2   Wound Depth (cm) 0.3 cm 0.1 cm   Wound Volume (cm^3) 1.44 cm^3 0.001 cm^3   Wound Healing % -- 100   Margins Well-defined edges Well-defined edges   Non-staged Wound Description Full thickness Full thickness   Meagan-wound Assessment Edema Edema;Hemosiderin staining;Dry   Wound Granulation Tissue -- Pink;Firm   Wound Bed Granulation (%) -- 100 %   Wound Bed Slough (%) 100 % --   Wound Odor None None       Inactive Orders   Date Order Priority Status Authorizing Provider   06/12/24 0940 Cellular tissue product application Venous Ulcer Left Ankle Routine Completed Marce Woodson MD   05/29/24 0927 Cellular tissue product application Venous Ulcer Left Ankle Routine Completed Anay Finn APRN   05/15/24 0925 Debridement Venous Ulcer Left Ankle Routine Completed nAay Finn APRN       Compression Wrap 05/08/24 Ankle Anterior;Left (Active)   Placement Date/Time: 05/08/24 1545   Location: Ankle  Wound Location Orientation: Anterior;Left      Assessments 5/8/2024  3:45 PM 6/19/2024  9:16 AM   Response to Treatment Well tolerated Well  tolerated   Compression Layers Multilayer Multilayer   Compression Product Type Unna Boot Unna Boot;Comprilan 12cm   Dressing Applied Yes Yes   Compression Wrap Location Toes to Knee Toes to Knee   Compression Wrap Status Clean;Dry Clean;Dry       No associated orders.          ASSESSMENT AND PLAN:    1. Chronic venous hypertension with ulcer and inflammation, left (HCC)    2. 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  Questions and concerns addressed. Red flags to RTC or ED reviewed.  Patient (or parent) agrees to plan.      NOTE TO PATIENT: The 21st Century Cures Act makes clinical notes like these available to patients in the interest of transparency. Clinical notes are medical documents used by physicians and care providers to communicate with each other. These documents include medical language and terminology, abbreviations, and treatment information that may sound technical and at times possibly unfamiliar. In addition, at times, the verbiage may appear blunt or direct. These documents are one tool providers use to communicate relevant information and clinical opinions of the care providers in a way that allows common understanding of the clinical context.    I [id number] with the patient. This time included:    preparing to see the patient (eg, review notes and recent diagnostics),  seeing the patient, obtaining and/or reviewing separately obtained history, performing a medically appropriate examination and/or evaluation, counseling and educating the patient, documenting in the record.   DISCHARGE:      Patient Instructions   Please return:  1 week-will order compression stockings, bring them to next appointment     Patient discharge and wound care instructions  Raissa GUILLEN Rio  6/26/2024       You may shower with protection of the wound (ie a cast cover or  similar).     Cleansing/dressing:       In clinic/home health care, with each dressing change:    please cleanse the limb, foot, and between toes with soap, water and washcloth.   Dry thoroughly.    Cleanse/soak the wound with VASHE (or other hypochlorous wound cleanser), dab dry.    Apply the following dressings:   betamethasone>xeroform> 20-30mmhg calamine compression with rosadil padding on anterior ankle and anterior tibia    Managing your edema:  Avoid prolonged standing in one place. It is better to have your calf muscles moving to pump fluid out of the legs      Elevate leg(s) above the level of the heart when sitting or as much as possible.  Take you diuretics as directed by your physician. Do not skip doses or change doses unless instructed to do so by your physician.  Decrease salt intake, follow recommended 2 grams daily.  Do not get leg(s) with compression wrap wet. If wraps are too tight as indicated by pain, numbness/tingling or discoloration of toes remove wrap completely and call the wound center @ [phone].  Refer to the \"Multi-layer compression bandage application patient information sheet\" given to you in clinic.     What Are Compression Wraps?   Compression wraps are elastic bandages that wrap around a part of your body to keep swelling down. The wraps create pressure which pushes extra fluid out of a certain area. This improves blood flow to that part of the body and helps it heal faster.   Compression wraps come in different styles. Your doctor will recommend the best compression wrap for your needs.  How Do I Take Care of a Compression Wrap?   Compression wraps can be worn for up to seven days if you take good care of them. Here's how to make them last and keep them working right:   Keep them clean and dry until your next doctor's appointment.   Wear thin, stretchable stockings over the wrap to hold it in place. This keeps the wrap from sticking to your sheets while sleeping. It also keeps  your clothing from sticking to the wrap.   Wear shoes that can fit comfortably around a wrap that covers your leg and foot.       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 Patient has been compliant with 30-40 mmHg compression bandaging, elevation, and exercise over the past 4 weeks, but still presents with significant swelling and hyperpigmentation and ulceration.      Nutrition and blood sugar control:  Focus on the following:  Protein: Meats, beans, eggs, milk and yogurt particularly Greek yogurt), tofu, soy nuts, soy protein products (Follow the protein handout in your welcome folder)  Vitamin C: Citrus fruits and juices, strawberries, tomatoes, tomato juice, peppers, baked potatoes, spinach, broccoli, cauliflower, [place] sprouts, cabbage  Vitamin A: Dark green, leafy vegetables, orange or yellow vegetables, cantaloupe, fortified dairy products, liver, fortified cereals  Zinc: Fortified cereals, red meats, seafood  Consider supplementing with Dao by [employer]. It can be purchased on amazon, [employer] website, or local pharmacy may be able to order it for you.  (These are essential branch chain amino acids that help with tissue building and wound healing).     Concerns:  Signs of infection may include the following:  Increase in redness  Red \"streaks\" from wound  Increase in swelling  Fever  Unusual odor  Change in the amount of wound drainage     Should you experience any significant changes in your wound(s) or have any questions regarding your home care instructions please contact the [facility] @ [phone] If after regular business hours, please call your family doctor or local emergency room. The treatment plan has been discussed at length between you and your provider. Follow all instructions carefully, it is very important. If you do not follow all instructions you are at risk of your wound not healing,  infection, possible loss of limb and even loss of life.    Anay Finn FNP-C, CWCN-AP, CFCN, CSWS, WCC, DWC  6/26/2024

## 2024-06-26 ENCOUNTER — OFFICE VISIT (OUTPATIENT)
Dept: WOUND CARE | Facility: HOSPITAL | Age: 84
End: 2024-06-26
Attending: NURSE PRACTITIONER

## 2024-06-26 ENCOUNTER — APPOINTMENT (OUTPATIENT)
Dept: WOUND CARE | Facility: HOSPITAL | Age: 84
End: 2024-06-26
Attending: NURSE PRACTITIONER

## 2024-06-26 VITALS
TEMPERATURE: 98 F | DIASTOLIC BLOOD PRESSURE: 57 MMHG | HEART RATE: 48 BPM | SYSTOLIC BLOOD PRESSURE: 123 MMHG | RESPIRATION RATE: 16 BRPM

## 2024-06-26 DIAGNOSIS — L97.325 NON-PRESSURE CHRONIC ULCER OF LEFT ANKLE WITH MUSCLE INVOLVEMENT WITHOUT EVIDENCE OF NECROSIS (HCC): ICD-10-CM

## 2024-06-26 DIAGNOSIS — I87.332 CHRONIC VENOUS HYPERTENSION WITH ULCER AND INFLAMMATION, LEFT (HCC): Primary | ICD-10-CM

## 2024-06-26 PROCEDURE — 99213 OFFICE O/P EST LOW 20 MIN: CPT | Performed by: NURSE PRACTITIONER

## 2024-06-26 NOTE — PATIENT INSTRUCTIONS
Please return:  1 week-will order compression stockings, bring them to next appointment     Patient discharge and wound care instructions  Raissa Pate  6/26/2024       You may shower with protection of the wound (ie a cast cover or similar).     Cleansing/dressing:       In clinic/home health care, with each dressing change:    please cleanse the limb, foot, and between toes with soap, water and washcloth.   Dry thoroughly.    Cleanse/soak the wound with VASHE (or other hypochlorous wound cleanser), dab dry.    Apply the following dressings:   betamethasone>xeroform> 20-30mmhg calamine compression with rosadil padding on anterior ankle and anterior tibia    Managing your edema:  Avoid prolonged standing in one place. It is better to have your calf muscles moving to pump fluid out of the legs      Elevate leg(s) above the level of the heart when sitting or as much as possible.  Take you diuretics as directed by your physician. Do not skip doses or change doses unless instructed to do so by your physician.  Decrease salt intake, follow recommended 2 grams daily.  Do not get leg(s) with compression wrap wet. If wraps are too tight as indicated by pain, numbness/tingling or discoloration of toes remove wrap completely and call the wound center @ [phone].  Refer to the \"Multi-layer compression bandage application patient information sheet\" given to you in clinic.     What Are Compression Wraps?   Compression wraps are elastic bandages that wrap around a part of your body to keep swelling down. The wraps create pressure which pushes extra fluid out of a certain area. This improves blood flow to that part of the body and helps it heal faster.   Compression wraps come in different styles. Your doctor will recommend the best compression wrap for your needs.  How Do I Take Care of a Compression Wrap?   Compression wraps can be worn for up to seven days if you take good care of them. Here's how to make them last  and keep them working right:   Keep them clean and dry until your next doctor's appointment.   Wear thin, stretchable stockings over the wrap to hold it in place. This keeps the wrap from sticking to your sheets while sleeping. It also keeps your clothing from sticking to the wrap.   Wear shoes that can fit comfortably around a wrap that covers your leg and foot.       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 Patient has been compliant with 30-40 mmHg compression bandaging, elevation, and exercise over the past 4 weeks, but still presents with significant swelling and hyperpigmentation and ulceration.      Nutrition and blood sugar control:  Focus on the following:  Protein: Meats, beans, eggs, milk and yogurt particularly Greek yogurt), tofu, soy nuts, soy protein products (Follow the protein handout in your welcome folder)  Vitamin C: Citrus fruits and juices, strawberries, tomatoes, tomato juice, peppers, baked potatoes, spinach, broccoli, cauliflower, [place] sprouts, cabbage  Vitamin A: Dark green, leafy vegetables, orange or yellow vegetables, cantaloupe, fortified dairy products, liver, fortified cereals  Zinc: Fortified cereals, red meats, seafood  Consider supplementing with Dao by [employer]. It can be purchased on amazon, [employer] website, or local pharmacy may be able to order it for you.  (These are essential branch chain amino acids that help with tissue building and wound healing).     Concerns:  Signs of infection may include the following:  Increase in redness  Red \"streaks\" from wound  Increase in swelling  Fever  Unusual odor  Change in the amount of wound drainage     Should you experience any significant changes in your wound(s) or have any questions regarding your home care instructions please contact the wound center [facility] @ [phone] If after regular business hours, please call your family doctor or  local emergency room. The treatment plan has been discussed at length between you and your provider. Follow all instructions carefully, it is very important. If you do not follow all instructions you are at risk of your wound not healing, infection, possible loss of limb and even loss of life.

## 2024-06-26 NOTE — PROGRESS NOTES
.Weekly Wound Education Note    Teaching Provided To: Patient;Family  Training Topics: Discharge instructions;Dressing;Edema control;Compression  Training Method: Explain/Verbal;Written  Training Response: Reinforcement needed;Patient responds and understands            Wound is fragilely healed.  Folded xeroform applied, calamine unna boot 20-30mmHg with rosidol over anterior leg for protection.  Compression stockings ordered this visit, instructed to bring to next appointment.

## 2024-07-02 NOTE — PROGRESS NOTES
CHIEF COMPLAINT:     Chief Complaint   Patient presents with    Wound Care     Patient is here for a wound care follow up. She denies any current pain to the wound.      HPI:   Information obtained from patient and chart  5-8-24 INITIAL  82 yo with pmhx of htn, high cholesterol, venous insufficiency with ulceration.  She presented to [facility] with left foot ulceration, patient was started on Keflex, which patient completed today.  Patient is here today with her son. She has been utilizing neosporin to the wound.  Her recent esr was wnl. Son reports that patient had ulcerations many years ago, and then when she went to [facility] for many years they healed.  She returned here to the [facility] and is living with her son and has redeveloped the ulceration. He states she sleeps in a bed, but spends a lot of time in the kitchen at the counter even though they advise her to sit and elevate her legs.  She has scarring and hypopigmentation noted on the left leg and venous changes bilaterally.  They have seen a Dr. Sinclair (son is not sure what organization md is with) and they will be doing a vein mapping at the end of may.  We discussed that the ulceration is directly over the malleloous. Will get xray. Will start compression and advanced wound dressings. Today there is not any s/s of infection. Patient does have pain with movement and touch to the area. But no visible movement in the wound bed.    HPI PIROR TO JUNE 2024 SEE INDIVIDUAL PROGRESS NOTES  6-5-24 patient returns, last week we placed epifix.  They were unable to get Vascular moved up to earlier date.  The wound is smaller today, there is not a small epifix disc available-will utilize sheila. Patient is c/o pain and itching along medial aspect of anterior tibia.  There is slight redness noted there.  Will decrease the wrap to 20-30mmhg and add padding along anterior tibia. Will also treat itching with betamethasone.  No acute s/s of infection. Patient will f/u with   Meghan in my [facility] for epifix placement prn    6-26-24 patient returns with grace.  She has had 2 applications of epifix with good results. Last week it appeared the wound was fragilly resolved, she was kept in compression. She saw cardiology on 6-19 they started flecainide and toprol, also discussed stockings and purusing venaseal/schlerotherapy, patient also referred for memory issues.   Today wound is resolved, will wrap with compression and moist dressing for one more week to allow the tissue to increase in tensile strength.  She has been using her compression pumps.  We will order 2 layer mediven stockings.  Family instructed to bring compression stockings next week and we will transition into them. No s/s of infection, no c/o pain, just itching.    7-3-24 Patient returns last week wound was fragilly resolved and we discussed family bringing compression stockings to transition into. Patient is presenting with granddaughter and she is coming from getting a venous px done.  They do not have stockings, but she does have compression on the right leg.  Will wrap in compression wrap and son can remove and replace with patient's compression. The wound is resolved. Patient dc'd from clinic to f/u as needed.  MEDICATIONS:     Current Outpatient Medications:     losartan 25 MG Oral Tab, Take 40 mg by mouth daily., Disp: , Rfl:     Solifenacin Succinate 5 MG Oral Tab, Take 1 tablet (5 mg total) by mouth daily., Disp: , Rfl:     telmisartan 40 MG Oral Tab, Take 1 tablet (40 mg total) by mouth daily., Disp: , Rfl:     cephalexin 500 MG Oral Cap, Take 1 capsule (500 mg total) by mouth 2 (two) times daily. (Patient not taking: Reported on 5/8/2024), Disp: , Rfl:     naproxen 500 MG Oral Tab, Take 1 tablet (500 mg total) by mouth 2 (two) times daily as needed., Disp: 20 tablet, Rfl: 0    metoprolol succinate 12.5 mg Oral Tab, Take 1 Partial Tablet (12.5 mg total) by mouth Daily Beta Blocker., Disp: , Rfl:      Levothyroxine Sodium 100 MCG Oral Tab, Take 1 tablet (100 mcg total) by mouth before breakfast., Disp: , Rfl:     atorvastatin 10 MG Oral Tab, Take 1 tablet (10 mg total) by mouth nightly., Disp: , Rfl:     Meclizine HCl 25 MG Oral Tab, Take 1 tablet (25 mg total) by mouth 3 (three) times daily as needed., Disp: 20 tablet, Rfl: 0  ALLERGIES:   No Known Allergies   REVIEW OF SYSTEMS:   This information was obtained from the patient/family and chart.    See HPI for pertinent positives, otherwise 10 pt ROS negative.  HISTORY:   Past medical, surgical, family and social history updated where appropriate.    PHYSICAL EXAM:     Vitals:    07/03/24 1123   BP: 125/60   Pulse: (!) 42   Resp: 16   Temp: 98.2 °F (36.8 °C)     Estimated body mass index is 23.25 kg/m² as calculated from the following:    Height as of 5/8/24: 60\".    Weight as of 5/8/24: 119 lb 0.8 oz (54 kg).   POC Glucose   Date Value Ref Range Status   12/08/2017 128 (H) 65 - 99 mg/dL Final       Vital signs reviewed.Appears stated age, well groomed.    Constitutional:  Bp wnl for patient. Pulse Regular and bradycardic (patient's baseline) Respirations easy and unlabored. Temperature wnl. Elevated bmi. Appearance neat and clean. Appears in no acute distress. Well nourished and well developed.    Lower extremity:  dp/pt palpable left. Left  lower extremity + for varicosities, +edema stable, +hyperpigmentation and hypopigmentation on the left, scarring noted on the left. Capillary refill < 3 seconds. Digits are warm. toenails are wnl for color, thickness and hygeine. Skin hydration wnl. no hairgrowth on legs.    Musculoskeletal:  Gait and station stable   Integumentary:  refer to wound characteristics and images   Psychiatric:  Judgment and insight intact. Alert and oriented times 3. No evidence of depression, anxiety, or agitation. Calm, cooperative, and communicative.   EDEMA:   Calf  Point of Measurement - Left Calf: 30     Left Calf from:: Heel  Calf Left  cm:: 33.1        Ankle  Point of Measurement - Left Ankle: 10     Left Ankle from:: Heel  Left Ankle cm:: 19              DIAGNOSTICS:     Lab Results   Component Value Date    BUN 24 (H) 05/02/2024    CREATSERUM 1.12 (H) 05/02/2024    ALB 3.5 05/02/2024    TP 8.1 05/02/2024 5-21-24 venous reflux study-advocate  1. There is no evidence of acute or chronic deep vein or superficial vein  thrombosis in the right lower extremity and left lower extremity where  visualized.  2. There is no evidence of deep vein insufficiency in the right lower  extremity and left lower extremity where visualized.  3. There is no evidence of venous insufficiency in the right anterior  accessory saphenous vein and left anterior accessory saphenous vein.  4. There is no evidence of venous insufficiency in the left small saphenous  vein.  5. Severe reflux in the right great saphenous vein and left great saphenous  vein.  6. Severe reflux in the right small saphenous vein.  7. Severe reflux in the right calf tributaries and left calf tributaries.  8. Severe reflux in the left incompetent .     5-8-24 left ankle xray  CONCLUSION:    Overlying bandage material obscures fine osseous detail.    No appreciable fracture or traumatic malalignment.  The ankle mortise and joint spaces of the hindfoot are preserved.  Small Achilles and plantar calcaneal spurs noted.  Mild soft tissue swelling diffusely about the hindfoot.  No focal areas of   ulceration identified.     WOUND ASSESSMENT:     Wound 05/08/24 #1- Left Medial Ankle Ankle Left (Active)   Date First Assessed/Time First Assessed: 05/08/24 1421    Wound Number (Wound Clinic Only): #1- Left Medial Ankle  Primary Wound Type: Venous Ulcer  Location: Ankle  Wound Location Orientation: Left      Assessments 5/8/2024  2:22 PM 7/3/2024 11:25 AM   Wound Image       Drainage Amount Moderate None   Drainage Description Serous;Yellow --   Treatments Compression --   Wound Length (cm) 2 cm 0.1  cm   Wound Width (cm) 2.4 cm 0.1 cm   Wound Surface Area (cm^2) 4.8 cm^2 0.01 cm^2   Wound Depth (cm) 0.3 cm 0 cm   Wound Volume (cm^3) 1.44 cm^3 0 cm^3   Wound Healing % -- 100   Margins Well-defined edges Well-defined edges   Non-staged Wound Description Full thickness Full thickness   Meagna-wound Assessment Edema Edema;Hemosiderin staining;Dry   Wound Granulation Tissue -- Firm;Pink   Wound Bed Granulation (%) -- 100 %   Wound Bed Slough (%) 100 % --   Wound Odor None None   Tunneling? -- No   Undermining? -- No   Sinus Tracts? -- No       Inactive Orders   Date Order Priority Status Authorizing Provider   06/12/24 0940 Cellular tissue product application Venous Ulcer Left Ankle Routine Completed Marce Woodson MD   05/29/24 0927 Cellular tissue product application Venous Ulcer Left Ankle Routine Completed Anay Finn APRN   05/15/24 0925 Debridement Venous Ulcer Left Ankle Routine Completed Anay Finn APRN       Compression Wrap 05/08/24 Ankle Anterior;Left (Active)   Placement Date/Time: 05/08/24 1545   Location: Ankle  Wound Location Orientation: Anterior;Left      Assessments 5/8/2024  3:45 PM 6/26/2024 10:37 AM   Response to Treatment Well tolerated Well tolerated   Compression Layers Multilayer Multilayer   Compression Product Type Unna Boot Unna Boot   Dressing Applied Yes Yes   Compression Wrap Location Toes to Knee Toes to Knee   Compression Wrap Status Clean;Dry Dry;Clean       No associated orders.          ASSESSMENT AND PLAN:    1. Chronic venous hypertension with ulcer and inflammation, left (HCC)    2. 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  Questions and concerns addressed. Red flags to RTC or ED reviewed.  Patient (or parent) agrees to plan.      NOTE TO PATIENT: The 21st Century Cures Act makes clinical notes like these available to  patients in the interest of transparency. Clinical notes are medical documents used by physicians and care providers to communicate with each other. These documents include medical language and terminology, abbreviations, and treatment information that may sound technical and at times possibly unfamiliar. In addition, at times, the verbiage may appear blunt or direct. These documents are one tool providers use to communicate relevant information and clinical opinions of the care providers in a way that allows common understanding of the clinical context.    I [id number] with the patient. This time included:    preparing to see the patient (eg, review notes and recent diagnostics),  seeing the patient, obtaining and/or reviewing separately obtained history, performing a medically appropriate examination and/or evaluation, counseling and educating the patient, documenting in the record.   DISCHARGE:      Patient Instructions   Patient discharge and wound care instructions  Raissa Stoneneymar  7/3/2024   Patient is healed.  Remove the wrap no later then July 10, 2024 and then place in compression stockings.  Wear whenever legs are dependent.    Avoid prolonged standing in one place. It is better to have your calf muscles moving to pump fluid out of the legs      Elevate leg(s) above the level of the heart when sitting or as much as possible.  Take you diuretics as directed by your physician. Do not skip doses or change doses unless instructed to do so by your physician.  Decrease salt intake, follow recommended 2 grams daily.       Anay Finn FNP-C, CWCN-AP, CFCN, CSWS, WCC, DWC  7/3/2024

## 2024-07-03 ENCOUNTER — APPOINTMENT (OUTPATIENT)
Dept: CARDIOLOGY | Age: 84
End: 2024-07-03
Attending: INTERNAL MEDICINE

## 2024-07-03 ENCOUNTER — OFFICE VISIT (OUTPATIENT)
Dept: CARDIOLOGY | Age: 84
End: 2024-07-03

## 2024-07-03 ENCOUNTER — ANCILLARY PROCEDURE (OUTPATIENT)
Dept: CARDIOLOGY | Age: 84
End: 2024-07-03
Attending: INTERNAL MEDICINE

## 2024-07-03 ENCOUNTER — TELEPHONE (OUTPATIENT)
Dept: CARDIOLOGY | Age: 84
End: 2024-07-03

## 2024-07-03 ENCOUNTER — OFFICE VISIT (OUTPATIENT)
Dept: WOUND CARE | Facility: HOSPITAL | Age: 84
End: 2024-07-03
Attending: NURSE PRACTITIONER
Payer: MEDICARE

## 2024-07-03 VITALS
RESPIRATION RATE: 16 BRPM | HEART RATE: 42 BPM | DIASTOLIC BLOOD PRESSURE: 60 MMHG | SYSTOLIC BLOOD PRESSURE: 125 MMHG | TEMPERATURE: 98 F

## 2024-07-03 VITALS
RESPIRATION RATE: 98 BRPM | HEART RATE: 50 BPM | DIASTOLIC BLOOD PRESSURE: 74 MMHG | OXYGEN SATURATION: 98 % | SYSTOLIC BLOOD PRESSURE: 151 MMHG

## 2024-07-03 DIAGNOSIS — R00.1 BRADYCARDIA, UNSPECIFIED: ICD-10-CM

## 2024-07-03 DIAGNOSIS — I86.8 VARICOSE VEINS OF OTHER SPECIFIED SITES: ICD-10-CM

## 2024-07-03 DIAGNOSIS — I49.3 PVC (PREMATURE VENTRICULAR CONTRACTION): Primary | ICD-10-CM

## 2024-07-03 DIAGNOSIS — I49.3 PVC (PREMATURE VENTRICULAR CONTRACTION): ICD-10-CM

## 2024-07-03 DIAGNOSIS — L97.325 NON-PRESSURE CHRONIC ULCER OF LEFT ANKLE WITH MUSCLE INVOLVEMENT WITHOUT EVIDENCE OF NECROSIS (HCC): ICD-10-CM

## 2024-07-03 DIAGNOSIS — I83.893 VARICOSE VEINS OF BOTH LOWER EXTREMITIES WITH COMPLICATIONS: ICD-10-CM

## 2024-07-03 DIAGNOSIS — I87.332 CHRONIC VENOUS HYPERTENSION WITH ULCER AND INFLAMMATION, LEFT (HCC): Primary | ICD-10-CM

## 2024-07-03 PROCEDURE — 36482 ENDOVEN THER CHEM ADHES 1ST: CPT | Performed by: INTERNAL MEDICINE

## 2024-07-03 PROCEDURE — 99213 OFFICE O/P EST LOW 20 MIN: CPT | Performed by: NURSE PRACTITIONER

## 2024-07-03 PROCEDURE — 93000 ELECTROCARDIOGRAM COMPLETE: CPT | Performed by: INTERNAL MEDICINE

## 2024-07-03 RX ORDER — LIDOCAINE HYDROCHLORIDE 10 MG/ML
5 INJECTION, SOLUTION INFILTRATION; PERINEURAL ONCE
Status: SHIPPED | OUTPATIENT
Start: 2024-07-03

## 2024-07-03 RX ORDER — METOPROLOL SUCCINATE 25 MG/1
12.5 TABLET, EXTENDED RELEASE ORAL DAILY
Qty: 45 TABLET | Refills: 0 | Status: SHIPPED | OUTPATIENT
Start: 2024-07-03

## 2024-07-03 NOTE — PATIENT INSTRUCTIONS
Patient discharge and wound care instructions  Raissa Victoriakeylasobia  7/3/2024   Patient is healed.  Remove the wrap no later then July 10, 2024 and then place in compression stockings.  Wear whenever legs are dependent.    Avoid prolonged standing in one place. It is better to have your calf muscles moving to pump fluid out of the legs      Elevate leg(s) above the level of the heart when sitting or as much as possible.  Take you diuretics as directed by your physician. Do not skip doses or change doses unless instructed to do so by your physician.  Decrease salt intake, follow recommended 2 grams daily.

## 2024-07-03 NOTE — PROGRESS NOTES
.Weekly Wound Education Note    Teaching Provided To: Patient  Training Topics: Discharge instructions;Compression;Dressing;Edema control  Training Method: Explain/Verbal;Written  Training Response: Patient responds and understands;Reinforcement needed            Wound is healed.  Calamine unna boot 20-30mmHg, xeroform.  Patients family may remove tomorrow or keep on for 7 days.  Transition into patients compression stockings.  Discharged from clinic.

## 2024-07-05 ENCOUNTER — TELEPHONE (OUTPATIENT)
Dept: CARDIOLOGY | Age: 84
End: 2024-07-05

## 2024-07-05 ENCOUNTER — ANCILLARY PROCEDURE (OUTPATIENT)
Dept: CARDIOLOGY | Age: 84
End: 2024-07-05
Attending: INTERNAL MEDICINE

## 2024-07-05 ENCOUNTER — APPOINTMENT (OUTPATIENT)
Dept: CARDIOLOGY | Age: 84
End: 2024-07-05

## 2024-07-05 DIAGNOSIS — I83.893 VARICOSE VEINS OF BOTH LOWER EXTREMITIES WITH COMPLICATIONS: ICD-10-CM

## 2024-07-06 PROBLEM — I49.3 FREQUENT PVCS: Status: ACTIVE | Noted: 2024-07-06

## 2024-07-07 ENCOUNTER — TELEPHONE (OUTPATIENT)
Dept: CARDIOLOGY | Age: 84
End: 2024-07-07

## 2024-07-08 ENCOUNTER — TELEPHONE (OUTPATIENT)
Dept: CARDIOLOGY | Age: 84
End: 2024-07-08

## 2024-07-08 DIAGNOSIS — E87.5 HYPERKALEMIA: Primary | ICD-10-CM

## 2024-07-08 RX ORDER — LOSARTAN POTASSIUM 50 MG/1
50 TABLET ORAL DAILY
Qty: 30 TABLET | Refills: 3 | Status: SHIPPED | OUTPATIENT
Start: 2024-07-08

## 2024-07-08 RX ORDER — HYDRALAZINE HYDROCHLORIDE 25 MG/1
25 TABLET, FILM COATED ORAL 2 TIMES DAILY
Qty: 60 TABLET | Refills: 1 | Status: SHIPPED | OUTPATIENT
Start: 2024-07-08

## 2024-07-09 RX ORDER — METOPROLOL SUCCINATE 25 MG/1
25 TABLET, EXTENDED RELEASE ORAL DAILY
Status: SHIPPED | COMMUNITY
Start: 2024-07-09 | End: 2024-07-12 | Stop reason: DRUGHIGH

## 2024-07-10 ENCOUNTER — TELEPHONE (OUTPATIENT)
Dept: CARDIOLOGY | Age: 84
End: 2024-07-10

## 2024-07-11 ENCOUNTER — TELEPHONE (OUTPATIENT)
Dept: CARDIOLOGY | Age: 84
End: 2024-07-11

## 2024-07-12 ENCOUNTER — APPOINTMENT (OUTPATIENT)
Dept: CARDIOLOGY | Age: 84
End: 2024-07-12
Attending: INTERNAL MEDICINE

## 2024-07-12 RX ORDER — METOPROLOL SUCCINATE 25 MG/1
12.5 TABLET, EXTENDED RELEASE ORAL DAILY
Status: SHIPPED | COMMUNITY
Start: 2024-07-12

## 2024-07-17 ENCOUNTER — ANCILLARY PROCEDURE (OUTPATIENT)
Dept: CARDIOLOGY | Age: 84
End: 2024-07-17
Attending: INTERNAL MEDICINE

## 2024-07-17 ENCOUNTER — APPOINTMENT (OUTPATIENT)
Dept: WOUND CARE | Facility: HOSPITAL | Age: 84
End: 2024-07-17
Attending: NURSE PRACTITIONER
Payer: MEDICARE

## 2024-07-17 ENCOUNTER — LAB SERVICES (OUTPATIENT)
Dept: LAB | Age: 84
End: 2024-07-17

## 2024-07-17 ENCOUNTER — OFFICE VISIT (OUTPATIENT)
Dept: CARDIOLOGY | Age: 84
End: 2024-07-17

## 2024-07-17 ENCOUNTER — APPOINTMENT (OUTPATIENT)
Dept: CARDIOLOGY | Age: 84
End: 2024-07-17
Attending: INTERNAL MEDICINE

## 2024-07-17 VITALS
DIASTOLIC BLOOD PRESSURE: 99 MMHG | RESPIRATION RATE: 16 BRPM | OXYGEN SATURATION: 100 % | HEART RATE: 69 BPM | SYSTOLIC BLOOD PRESSURE: 171 MMHG

## 2024-07-17 VITALS
HEART RATE: 83 BPM | RESPIRATION RATE: 18 BRPM | DIASTOLIC BLOOD PRESSURE: 99 MMHG | SYSTOLIC BLOOD PRESSURE: 171 MMHG | OXYGEN SATURATION: 98 %

## 2024-07-17 DIAGNOSIS — I95.1 ORTHOSTATIC HYPOTENSION: ICD-10-CM

## 2024-07-17 DIAGNOSIS — I83.893 VARICOSE VEINS OF BOTH LOWER EXTREMITIES WITH COMPLICATIONS: Primary | ICD-10-CM

## 2024-07-17 DIAGNOSIS — I83.893 VARICOSE VEINS OF BOTH LOWER EXTREMITIES WITH COMPLICATIONS: ICD-10-CM

## 2024-07-17 DIAGNOSIS — I86.8 VARICOSE VEINS OF OTHER SPECIFIED SITES: ICD-10-CM

## 2024-07-17 DIAGNOSIS — E87.5 HYPERKALEMIA: ICD-10-CM

## 2024-07-17 DIAGNOSIS — I49.3 FREQUENT PVCS: Primary | ICD-10-CM

## 2024-07-17 DIAGNOSIS — I49.3 FREQUENT PVCS: ICD-10-CM

## 2024-07-17 LAB
ANION GAP SERPL CALC-SCNC: 13 MMOL/L (ref 7–19)
APPEARANCE UR: CLEAR
BASOPHILS # BLD: 0 K/MCL (ref 0–0.3)
BASOPHILS NFR BLD: 1 %
BILIRUB UR QL STRIP: NEGATIVE
BUN SERPL-MCNC: 34 MG/DL (ref 6–20)
BUN/CREAT SERPL: 29 (ref 7–25)
CALCIUM SERPL-MCNC: 8.9 MG/DL (ref 8.4–10.2)
CHLORIDE SERPL-SCNC: 105 MMOL/L (ref 97–110)
CO2 SERPL-SCNC: 28 MMOL/L (ref 21–32)
COLOR UR: YELLOW
CREAT SERPL-MCNC: 1.18 MG/DL (ref 0.51–0.95)
DEPRECATED RDW RBC: 44.9 FL (ref 39–50)
EGFRCR SERPLBLD CKD-EPI 2021: 46 ML/MIN/{1.73_M2}
EOSINOPHIL # BLD: 0.1 K/MCL (ref 0–0.5)
EOSINOPHIL NFR BLD: 2 %
ERYTHROCYTE [DISTWIDTH] IN BLOOD: 12.4 % (ref 11–15)
FASTING DURATION TIME PATIENT: ABNORMAL H
GLUCOSE SERPL-MCNC: 130 MG/DL (ref 70–99)
GLUCOSE UR STRIP-MCNC: NEGATIVE MG/DL
HCT VFR BLD CALC: 35.9 % (ref 36–46.5)
HGB BLD-MCNC: 12.1 G/DL (ref 12–15.5)
HGB UR QL STRIP: NEGATIVE
IMM GRANULOCYTES # BLD AUTO: 0 K/MCL (ref 0–0.2)
IMM GRANULOCYTES # BLD: 0 %
KETONES UR STRIP-MCNC: NEGATIVE MG/DL
LEUKOCYTE ESTERASE UR QL STRIP: NEGATIVE
LYMPHOCYTES # BLD: 2.5 K/MCL (ref 1–4)
LYMPHOCYTES NFR BLD: 39 %
MAGNESIUM SERPL-MCNC: 1.9 MG/DL (ref 1.7–2.4)
MCH RBC QN AUTO: 33 PG (ref 26–34)
MCHC RBC AUTO-ENTMCNC: 33.7 G/DL (ref 32–36.5)
MCV RBC AUTO: 97.8 FL (ref 78–100)
MONOCYTES # BLD: 0.4 K/MCL (ref 0.3–0.9)
MONOCYTES NFR BLD: 5 %
NEUTROPHILS # BLD: 3.5 K/MCL (ref 1.8–7.7)
NEUTROPHILS NFR BLD: 53 %
NITRITE UR QL STRIP: NEGATIVE
PH UR STRIP: 6 [PH] (ref 5–7)
PLATELET # BLD AUTO: 202 K/MCL (ref 140–450)
POTASSIUM SERPL-SCNC: 5.5 MMOL/L (ref 3.4–5.1)
PROT UR STRIP-MCNC: NEGATIVE MG/DL
RBC # BLD: 3.67 MIL/MCL (ref 4–5.2)
SODIUM SERPL-SCNC: 140 MMOL/L (ref 135–145)
SP GR UR STRIP: 1.01 (ref 1–1.03)
UROBILINOGEN UR STRIP-MCNC: 0.2 MG/DL
WBC # BLD: 6.4 K/MCL (ref 4.2–11)

## 2024-07-17 PROCEDURE — 85025 COMPLETE CBC W/AUTO DIFF WBC: CPT | Performed by: INTERNAL MEDICINE

## 2024-07-17 PROCEDURE — 81003 URINALYSIS AUTO W/O SCOPE: CPT | Performed by: INTERNAL MEDICINE

## 2024-07-17 PROCEDURE — 83735 ASSAY OF MAGNESIUM: CPT | Performed by: INTERNAL MEDICINE

## 2024-07-17 PROCEDURE — 80048 BASIC METABOLIC PNL TOTAL CA: CPT | Performed by: INTERNAL MEDICINE

## 2024-07-17 PROCEDURE — 36415 COLL VENOUS BLD VENIPUNCTURE: CPT | Performed by: INTERNAL MEDICINE

## 2024-07-17 PROCEDURE — 93000 ELECTROCARDIOGRAM COMPLETE: CPT | Performed by: INTERNAL MEDICINE

## 2024-07-17 RX ORDER — DIAZEPAM 2 MG
5 TABLET ORAL ONCE
Status: COMPLETED | OUTPATIENT
Start: 2024-07-17 | End: 2024-07-17

## 2024-07-17 RX ORDER — DIAZEPAM 2 MG
5 TABLET ORAL ONCE
Status: SHIPPED | OUTPATIENT
Start: 2024-07-17

## 2024-07-17 RX ADMIN — Medication 5 MG: at 08:32

## 2024-07-19 ENCOUNTER — TELEPHONE (OUTPATIENT)
Dept: CARDIOLOGY | Age: 84
End: 2024-07-19

## 2024-07-19 ENCOUNTER — APPOINTMENT (OUTPATIENT)
Dept: CARDIOLOGY | Age: 84
End: 2024-07-19
Attending: INTERNAL MEDICINE

## 2024-07-19 ENCOUNTER — NURSE ONLY (OUTPATIENT)
Dept: CARDIOLOGY | Age: 84
End: 2024-07-19

## 2024-07-19 VITALS — SYSTOLIC BLOOD PRESSURE: 112 MMHG | DIASTOLIC BLOOD PRESSURE: 54 MMHG

## 2024-07-19 DIAGNOSIS — I95.1 ORTHOSTATIC HYPOTENSION: Primary | ICD-10-CM

## 2024-07-19 DIAGNOSIS — I83.812 VARICOSE VEINS OF LEFT LOWER EXTREMITY WITH PAIN: Primary | ICD-10-CM

## 2024-07-19 DIAGNOSIS — I83.893 VARICOSE VEINS OF BOTH LOWER EXTREMITIES WITH COMPLICATIONS: ICD-10-CM

## 2024-07-19 DIAGNOSIS — R00.1 BRADYCARDIA, UNSPECIFIED: ICD-10-CM

## 2024-07-24 ENCOUNTER — APPOINTMENT (OUTPATIENT)
Dept: WOUND CARE | Facility: HOSPITAL | Age: 84
End: 2024-07-24
Attending: NURSE PRACTITIONER
Payer: MEDICARE

## 2024-07-29 ENCOUNTER — TELEPHONE (OUTPATIENT)
Dept: CARDIOLOGY | Age: 84
End: 2024-07-29

## 2024-07-29 ENCOUNTER — APPOINTMENT (OUTPATIENT)
Dept: CARDIOLOGY | Age: 84
End: 2024-07-29
Attending: INTERNAL MEDICINE

## 2024-07-29 VITALS — SYSTOLIC BLOOD PRESSURE: 112 MMHG | HEART RATE: 80 BPM | OXYGEN SATURATION: 99 % | DIASTOLIC BLOOD PRESSURE: 65 MMHG

## 2024-07-29 DIAGNOSIS — I86.8 VARICOSE VEINS OF OTHER SPECIFIED SITES: ICD-10-CM

## 2024-07-29 DIAGNOSIS — I83.893 VARICOSE VEINS OF BOTH LOWER EXTREMITIES WITH COMPLICATIONS: ICD-10-CM

## 2024-07-29 PROCEDURE — 36482 ENDOVEN THER CHEM ADHES 1ST: CPT | Performed by: INTERNAL MEDICINE

## 2024-07-29 RX ORDER — HYDRALAZINE HYDROCHLORIDE 25 MG/1
25 TABLET, FILM COATED ORAL AT BEDTIME
Status: SHIPPED | COMMUNITY
Start: 2024-07-29

## 2024-07-29 RX ORDER — DIAZEPAM 2 MG
5 TABLET ORAL ONCE
Status: COMPLETED | OUTPATIENT
Start: 2024-07-29 | End: 2024-07-29

## 2024-07-29 RX ADMIN — Medication 5 MG: at 15:37

## 2024-07-31 ENCOUNTER — APPOINTMENT (OUTPATIENT)
Dept: CARDIOLOGY | Age: 84
End: 2024-07-31
Attending: INTERNAL MEDICINE

## 2024-08-12 ENCOUNTER — APPOINTMENT (OUTPATIENT)
Dept: CARDIOLOGY | Age: 84
End: 2024-08-12
Attending: INTERNAL MEDICINE

## 2024-08-12 VITALS — SYSTOLIC BLOOD PRESSURE: 145 MMHG | DIASTOLIC BLOOD PRESSURE: 82 MMHG | HEART RATE: 66 BPM | OXYGEN SATURATION: 100 %

## 2024-08-12 DIAGNOSIS — I83.812 VARICOSE VEINS OF LEFT LOWER EXTREMITY WITH PAIN: ICD-10-CM

## 2024-08-12 PROCEDURE — 36475 ENDOVENOUS RF 1ST VEIN: CPT | Performed by: INTERNAL MEDICINE

## 2024-08-14 ENCOUNTER — APPOINTMENT (OUTPATIENT)
Dept: CARDIOLOGY | Age: 84
End: 2024-08-14
Attending: INTERNAL MEDICINE

## 2024-08-14 ENCOUNTER — TELEPHONE (OUTPATIENT)
Dept: CARDIOLOGY | Age: 84
End: 2024-08-14

## 2024-08-27 ENCOUNTER — APPOINTMENT (OUTPATIENT)
Dept: CARDIOLOGY | Age: 84
End: 2024-08-27
Attending: INTERNAL MEDICINE

## 2024-08-27 VITALS
SYSTOLIC BLOOD PRESSURE: 156 MMHG | OXYGEN SATURATION: 99 % | DIASTOLIC BLOOD PRESSURE: 88 MMHG | RESPIRATION RATE: 13 BRPM | HEART RATE: 69 BPM

## 2024-08-27 DIAGNOSIS — I83.893 VARICOSE VEINS OF BOTH LOWER EXTREMITIES WITH COMPLICATIONS: ICD-10-CM

## 2024-08-27 DIAGNOSIS — I86.8 VARICOSE VEINS OF OTHER SPECIFIED SITES: ICD-10-CM

## 2024-08-27 PROCEDURE — 76942 ECHO GUIDE FOR BIOPSY: CPT | Performed by: INTERNAL MEDICINE

## 2024-08-27 PROCEDURE — 36471 NJX SCLRSNT MLT INCMPTNT VN: CPT | Performed by: INTERNAL MEDICINE

## 2024-08-28 ENCOUNTER — APPOINTMENT (OUTPATIENT)
Dept: CARDIOLOGY | Age: 84
End: 2024-08-28
Attending: INTERNAL MEDICINE

## 2024-09-09 ENCOUNTER — APPOINTMENT (OUTPATIENT)
Dept: CARDIOLOGY | Age: 84
End: 2024-09-09
Attending: INTERNAL MEDICINE

## 2024-09-09 VITALS — HEART RATE: 73 BPM | SYSTOLIC BLOOD PRESSURE: 162 MMHG | DIASTOLIC BLOOD PRESSURE: 83 MMHG | RESPIRATION RATE: 98 BRPM

## 2024-09-09 DIAGNOSIS — I83.893 VARICOSE VEINS OF BOTH LOWER EXTREMITIES WITH COMPLICATIONS: ICD-10-CM

## 2024-09-09 DIAGNOSIS — I86.8 VARICOSE VEINS OF OTHER SPECIFIED SITES: ICD-10-CM

## 2024-09-09 PROCEDURE — 36471 NJX SCLRSNT MLT INCMPTNT VN: CPT | Performed by: INTERNAL MEDICINE

## 2024-09-09 PROCEDURE — 76942 ECHO GUIDE FOR BIOPSY: CPT | Performed by: INTERNAL MEDICINE

## 2024-10-16 ENCOUNTER — TELEPHONE (OUTPATIENT)
Dept: CARDIOLOGY | Age: 84
End: 2024-10-16

## 2024-10-16 RX ORDER — HYDRALAZINE HYDROCHLORIDE 25 MG/1
25 TABLET, FILM COATED ORAL AT BEDTIME
Status: CANCELLED | OUTPATIENT
Start: 2024-10-16

## 2024-10-30 PROBLEM — K59.00 CONSTIPATION: Status: ACTIVE | Noted: 2024-10-30

## 2024-11-13 ENCOUNTER — APPOINTMENT (OUTPATIENT)
Dept: CARDIOLOGY | Age: 84
End: 2024-11-13

## 2024-11-13 ENCOUNTER — TELEPHONE (OUTPATIENT)
Dept: CARDIOLOGY | Age: 84
End: 2024-11-13

## 2024-11-13 ENCOUNTER — LAB SERVICES (OUTPATIENT)
Dept: LAB | Age: 84
End: 2024-11-13

## 2024-11-13 ENCOUNTER — ANCILLARY PROCEDURE (OUTPATIENT)
Dept: CARDIOLOGY | Age: 84
End: 2024-11-13
Attending: INTERNAL MEDICINE

## 2024-11-13 VITALS
DIASTOLIC BLOOD PRESSURE: 80 MMHG | HEIGHT: 58 IN | HEART RATE: 52 BPM | RESPIRATION RATE: 16 BRPM | BODY MASS INDEX: 29.71 KG/M2 | WEIGHT: 141.54 LBS | OXYGEN SATURATION: 99 % | SYSTOLIC BLOOD PRESSURE: 120 MMHG

## 2024-11-13 DIAGNOSIS — I10 ESSENTIAL HYPERTENSION: ICD-10-CM

## 2024-11-13 DIAGNOSIS — E78.2 MIXED HYPERLIPIDEMIA: ICD-10-CM

## 2024-11-13 DIAGNOSIS — Z91.89 AT RISK FOR SLEEP APNEA: ICD-10-CM

## 2024-11-13 DIAGNOSIS — I49.3 FREQUENT PVCS: ICD-10-CM

## 2024-11-13 DIAGNOSIS — E03.9 HYPOTHYROIDISM, UNSPECIFIED TYPE: ICD-10-CM

## 2024-11-13 DIAGNOSIS — R06.83 SNORING: ICD-10-CM

## 2024-11-13 DIAGNOSIS — G47.10 EXCESSIVE SOMNOLENCE DISORDER: ICD-10-CM

## 2024-11-13 DIAGNOSIS — R53.83 FATIGUE, UNSPECIFIED TYPE: ICD-10-CM

## 2024-11-13 DIAGNOSIS — I49.3 FREQUENT PVCS: Primary | ICD-10-CM

## 2024-11-13 LAB
ANION GAP SERPL CALC-SCNC: 11 MMOL/L (ref 7–19)
BUN SERPL-MCNC: 22 MG/DL (ref 6–20)
BUN/CREAT SERPL: 25 (ref 7–25)
CALCIUM SERPL-MCNC: 9.6 MG/DL (ref 8.4–10.2)
CHLORIDE SERPL-SCNC: 103 MMOL/L (ref 97–110)
CO2 SERPL-SCNC: 32 MMOL/L (ref 21–32)
CREAT SERPL-MCNC: 0.89 MG/DL (ref 0.51–0.95)
EGFRCR SERPLBLD CKD-EPI 2021: 65 ML/MIN/{1.73_M2}
FASTING DURATION TIME PATIENT: 8 HOURS (ref 0–999)
GLUCOSE SERPL-MCNC: 117 MG/DL (ref 70–99)
MAGNESIUM SERPL-MCNC: 1.9 MG/DL (ref 1.7–2.4)
POTASSIUM SERPL-SCNC: 4.7 MMOL/L (ref 3.4–5.1)
SODIUM SERPL-SCNC: 141 MMOL/L (ref 135–145)
TSH SERPL-ACNC: 4.54 MCUNITS/ML (ref 0.35–5)

## 2024-11-13 PROCEDURE — 36415 COLL VENOUS BLD VENIPUNCTURE: CPT | Performed by: INTERNAL MEDICINE

## 2024-11-13 PROCEDURE — 84443 ASSAY THYROID STIM HORMONE: CPT | Performed by: INTERNAL MEDICINE

## 2024-11-13 PROCEDURE — 83735 ASSAY OF MAGNESIUM: CPT | Performed by: INTERNAL MEDICINE

## 2024-11-13 PROCEDURE — 93000 ELECTROCARDIOGRAM COMPLETE: CPT | Performed by: INTERNAL MEDICINE

## 2024-11-13 PROCEDURE — 99215 OFFICE O/P EST HI 40 MIN: CPT | Performed by: INTERNAL MEDICINE

## 2024-11-13 PROCEDURE — 80048 BASIC METABOLIC PNL TOTAL CA: CPT | Performed by: INTERNAL MEDICINE

## 2024-11-13 RX ORDER — LOSARTAN POTASSIUM 25 MG/1
12.5 TABLET ORAL DAILY
COMMUNITY

## 2024-11-24 ENCOUNTER — OFF PREMISE (OUTPATIENT)
Dept: PULMONOLOGY | Age: 84
End: 2024-11-24

## 2024-11-24 DIAGNOSIS — R06.83 SNORING: Primary | ICD-10-CM

## 2024-11-24 DIAGNOSIS — G47.33 OBSTRUCTIVE SLEEP APNEA (ADULT) (PEDIATRIC): ICD-10-CM

## 2024-12-16 RX ORDER — METOPROLOL SUCCINATE 25 MG/1
12.5 TABLET, EXTENDED RELEASE ORAL DAILY
Qty: 45 TABLET | Refills: 3 | Status: SHIPPED | OUTPATIENT
Start: 2024-12-16

## 2025-02-10 ENCOUNTER — APPOINTMENT (OUTPATIENT)
Dept: CARDIOLOGY | Age: 85
End: 2025-02-10

## 2025-02-10 VITALS
DIASTOLIC BLOOD PRESSURE: 80 MMHG | OXYGEN SATURATION: 98 % | RESPIRATION RATE: 16 BRPM | SYSTOLIC BLOOD PRESSURE: 122 MMHG | WEIGHT: 141.6 LBS | HEART RATE: 76 BPM | HEIGHT: 58 IN | BODY MASS INDEX: 29.72 KG/M2

## 2025-02-10 DIAGNOSIS — I50.32 CHRONIC DIASTOLIC (CONGESTIVE) HEART FAILURE (CMD): ICD-10-CM

## 2025-02-10 DIAGNOSIS — R73.01 IMPAIRED FASTING GLUCOSE: ICD-10-CM

## 2025-02-10 DIAGNOSIS — I10 ESSENTIAL HYPERTENSION: ICD-10-CM

## 2025-02-10 DIAGNOSIS — I49.3 FREQUENT PVCS: Primary | ICD-10-CM

## 2025-02-10 DIAGNOSIS — E78.2 MIXED HYPERLIPIDEMIA: ICD-10-CM

## 2025-02-10 PROCEDURE — 93000 ELECTROCARDIOGRAM COMPLETE: CPT | Performed by: INTERNAL MEDICINE

## 2025-02-10 PROCEDURE — 99214 OFFICE O/P EST MOD 30 MIN: CPT | Performed by: INTERNAL MEDICINE

## 2025-02-19 ENCOUNTER — APPOINTMENT (OUTPATIENT)
Dept: CARDIOLOGY | Age: 85
End: 2025-02-19

## 2025-02-19 VITALS
HEIGHT: 58 IN | OXYGEN SATURATION: 98 % | SYSTOLIC BLOOD PRESSURE: 132 MMHG | BODY MASS INDEX: 29.92 KG/M2 | DIASTOLIC BLOOD PRESSURE: 70 MMHG | WEIGHT: 142.53 LBS | HEART RATE: 64 BPM

## 2025-02-19 DIAGNOSIS — I49.3 FREQUENT PVCS: Primary | ICD-10-CM

## 2025-02-19 PROCEDURE — 93000 ELECTROCARDIOGRAM COMPLETE: CPT | Performed by: STUDENT IN AN ORGANIZED HEALTH CARE EDUCATION/TRAINING PROGRAM

## 2025-02-19 PROCEDURE — 99204 OFFICE O/P NEW MOD 45 MIN: CPT | Performed by: STUDENT IN AN ORGANIZED HEALTH CARE EDUCATION/TRAINING PROGRAM

## 2025-02-24 ASSESSMENT — PATIENT HEALTH QUESTIONNAIRE - PHQ9
SUM OF ALL RESPONSES TO PHQ9 QUESTIONS 1 AND 2: 0
2. FEELING DOWN, DEPRESSED OR HOPELESS: NOT AT ALL
1. LITTLE INTEREST OR PLEASURE IN DOING THINGS: NOT AT ALL
SUM OF ALL RESPONSES TO PHQ9 QUESTIONS 1 AND 2: 0
CLINICAL INTERPRETATION OF PHQ2 SCORE: NO FURTHER SCREENING NEEDED

## 2025-04-14 RX ORDER — LOSARTAN POTASSIUM 25 MG/1
12.5 TABLET ORAL DAILY
Qty: 45 TABLET | Refills: 3 | Status: SHIPPED | OUTPATIENT
Start: 2025-04-14 | End: 2025-04-16 | Stop reason: SDUPTHER

## 2025-05-01 ENCOUNTER — APPOINTMENT (OUTPATIENT)
Dept: CARDIOLOGY | Age: 85
End: 2025-05-01
Attending: INTERNAL MEDICINE

## 2025-05-28 ENCOUNTER — APPOINTMENT (OUTPATIENT)
Dept: CARDIOLOGY | Age: 85
End: 2025-05-28

## 2025-05-28 VITALS
SYSTOLIC BLOOD PRESSURE: 122 MMHG | OXYGEN SATURATION: 100 % | WEIGHT: 140.1 LBS | DIASTOLIC BLOOD PRESSURE: 80 MMHG | BODY MASS INDEX: 29.41 KG/M2 | HEART RATE: 63 BPM | HEIGHT: 58 IN

## 2025-05-28 DIAGNOSIS — I49.3 FREQUENT PVCS: Primary | ICD-10-CM

## 2025-05-28 PROCEDURE — 99213 OFFICE O/P EST LOW 20 MIN: CPT | Performed by: STUDENT IN AN ORGANIZED HEALTH CARE EDUCATION/TRAINING PROGRAM

## 2025-05-28 PROCEDURE — 93000 ELECTROCARDIOGRAM COMPLETE: CPT | Performed by: STUDENT IN AN ORGANIZED HEALTH CARE EDUCATION/TRAINING PROGRAM

## 2025-07-14 ENCOUNTER — APPOINTMENT (OUTPATIENT)
Dept: CARDIOLOGY | Age: 85
End: 2025-07-14
Attending: INTERNAL MEDICINE

## 2025-07-23 ENCOUNTER — APPOINTMENT (OUTPATIENT)
Dept: CARDIOLOGY | Age: 85
End: 2025-07-23
Attending: INTERNAL MEDICINE

## 2025-07-23 DIAGNOSIS — I49.3 FREQUENT PVCS: ICD-10-CM

## 2025-07-23 LAB
AORTIC VALVE AREA (AVA): 1.1
ASCENDING AORTA (AAD): 3
AV MEAN PRESS GRAD SYS DOP V1V2: 5.7 MM[HG]
AV MEAN VELOCITY (AVMV): 1.14
AV ORIFICE AREA US: 1.54 CM2
AV PEAK GRADIENT (AVPG): 9.7
AV STENOSIS SEVERITY TEXT: NORMAL
AV VMAX SYS DOP: 1.56
AVI LVOT PEAK GRADIENT (LVOTMG): 1.1
E WAVE DECELARATION TIME (MDT): 8.53
INTERVENTRICULAR SEPTUM IN END DIASTOLE (IVSD): 2.24
LEFT INTERNAL DIMENSION IN SYSTOLE (LVSD): 1.1
LEFT VENTRICULAR INTERNAL DIMENSION IN DIASTOLE (LVDD): 2.3
LEFT VENTRICULAR POSTERIOR WALL IN END DIASTOLE (LVPW): 3.7
LV EF 2D ECHO EST: NORMAL %
LVOT 2D (LVOTD): 26.5
LVOT VTI (LVOTVTI): 11
MV E TISSUE VEL LAT (MELV): 1.04
MV E TISSUE VEL MED (MESV): 13.4
MV E WAVE VEL/E TISSUE VEL MED(MSR): 12.9
MV PEAK A VELOCITY (MVPAV): 224
MV PEAK E VELOCITY (MVPEV): 1.03
RV END SYSTOLIC LONGITUDINAL STRAIN FREE WALL (RVGS): 1.8
TRICUSPID VALVE ANNULAR PEAK VELOCITY (TVAPV): 22
TV ESTIMATED RIGHT ARTERIAL PRESSURE (RAP): 12

## 2025-07-23 PROCEDURE — 93306 TTE W/DOPPLER COMPLETE: CPT | Performed by: INTERNAL MEDICINE

## 2025-07-24 ENCOUNTER — TELEPHONE (OUTPATIENT)
Dept: CARDIOLOGY | Age: 85
End: 2025-07-24

## 2025-08-04 ENCOUNTER — APPOINTMENT (OUTPATIENT)
Dept: CARDIOLOGY | Age: 85
End: 2025-08-04
Attending: INTERNAL MEDICINE

## 2025-08-11 ENCOUNTER — LAB SERVICES (OUTPATIENT)
Dept: LAB | Age: 85
End: 2025-08-11

## 2025-08-11 DIAGNOSIS — I50.32 CHRONIC DIASTOLIC (CONGESTIVE) HEART FAILURE (CMD): ICD-10-CM

## 2025-08-11 DIAGNOSIS — I10 ESSENTIAL HYPERTENSION: ICD-10-CM

## 2025-08-11 DIAGNOSIS — E78.2 MIXED HYPERLIPIDEMIA: ICD-10-CM

## 2025-08-11 DIAGNOSIS — R73.01 IMPAIRED FASTING GLUCOSE: ICD-10-CM

## 2025-08-11 DIAGNOSIS — I49.3 FREQUENT PVCS: ICD-10-CM

## 2025-08-11 LAB
ALBUMIN SERPL-MCNC: 3.6 G/DL (ref 3.4–5)
ALBUMIN/GLOB SERPL: 0.9 {RATIO} (ref 1–2.4)
ALP SERPL-CCNC: 50 UNITS/L (ref 45–117)
ALT SERPL-CCNC: 18 UNITS/L
ANION GAP SERPL CALC-SCNC: 11 MMOL/L (ref 7–19)
AST SERPL-CCNC: 16 UNITS/L
BASOPHILS # BLD: 0.1 K/MCL (ref 0–0.3)
BASOPHILS NFR BLD: 1 %
BILIRUB SERPL-MCNC: 0.5 MG/DL (ref 0.2–1)
BUN SERPL-MCNC: 24 MG/DL (ref 6–20)
BUN/CREAT SERPL: 24 (ref 7–25)
CALCIUM SERPL-MCNC: 9.1 MG/DL (ref 8.4–10.2)
CHLORIDE SERPL-SCNC: 100 MMOL/L (ref 97–110)
CHOLEST SERPL-MCNC: 165 MG/DL
CHOLEST/HDLC SERPL: 3 {RATIO}
CO2 SERPL-SCNC: 30 MMOL/L (ref 21–32)
CREAT SERPL-MCNC: 1 MG/DL (ref 0.51–0.95)
DEPRECATED RDW RBC: 44.9 FL (ref 39–50)
EGFRCR SERPLBLD CKD-EPI 2021: 56 ML/MIN/{1.73_M2}
EOSINOPHIL # BLD: 0.1 K/MCL (ref 0–0.5)
EOSINOPHIL NFR BLD: 2 %
ERYTHROCYTE [DISTWIDTH] IN BLOOD: 11.9 % (ref 11–15)
FASTING DURATION TIME PATIENT: ABNORMAL H
GLOBULIN SER-MCNC: 3.9 G/DL (ref 2–4)
GLUCOSE SERPL-MCNC: 109 MG/DL (ref 70–99)
HBA1C MFR BLD: 6.8 % (ref 4.5–5.6)
HCT VFR BLD CALC: 36.9 % (ref 36–46.5)
HDLC SERPL-MCNC: 55 MG/DL
HGB BLD-MCNC: 12.1 G/DL (ref 12–15.5)
IMM GRANULOCYTES # BLD AUTO: 0 K/MCL (ref 0–0.2)
IMM GRANULOCYTES # BLD: 0 %
LDLC SERPL CALC-MCNC: 94 MG/DL
LYMPHOCYTES # BLD: 2.4 K/MCL (ref 1–4)
LYMPHOCYTES NFR BLD: 41 %
MAGNESIUM SERPL-MCNC: 1.9 MG/DL (ref 1.7–2.4)
MCH RBC QN AUTO: 33.2 PG (ref 26–34)
MCHC RBC AUTO-ENTMCNC: 32.8 G/DL (ref 32–36.5)
MCV RBC AUTO: 101.1 FL (ref 78–100)
MONOCYTES # BLD: 0.5 K/MCL (ref 0.3–0.9)
MONOCYTES NFR BLD: 8 %
NEUTROPHILS # BLD: 2.8 K/MCL (ref 1.8–7.7)
NEUTROPHILS NFR BLD: 48 %
NONHDLC SERPL-MCNC: 110 MG/DL
NRBC BLD MANUAL-RTO: 0 /100 WBC
NT-PROBNP SERPL-MCNC: 444 PG/ML
PLATELET # BLD AUTO: 168 K/MCL (ref 140–450)
POTASSIUM SERPL-SCNC: 5 MMOL/L (ref 3.4–5.1)
PROT SERPL-MCNC: 7.5 G/DL (ref 6.4–8.2)
RBC # BLD: 3.65 MIL/MCL (ref 4–5.2)
SODIUM SERPL-SCNC: 136 MMOL/L (ref 135–145)
TRIGL SERPL-MCNC: 78 MG/DL
TSH SERPL-ACNC: 3.01 MCUNITS/ML (ref 0.35–5)
WBC # BLD: 5.8 K/MCL (ref 4.2–11)

## 2025-08-11 PROCEDURE — 83036 HEMOGLOBIN GLYCOSYLATED A1C: CPT | Performed by: INTERNAL MEDICINE

## 2025-08-11 PROCEDURE — 83735 ASSAY OF MAGNESIUM: CPT | Performed by: INTERNAL MEDICINE

## 2025-08-11 PROCEDURE — 80053 COMPREHEN METABOLIC PANEL: CPT | Performed by: INTERNAL MEDICINE

## 2025-08-11 PROCEDURE — 83880 ASSAY OF NATRIURETIC PEPTIDE: CPT | Performed by: INTERNAL MEDICINE

## 2025-08-11 PROCEDURE — 84443 ASSAY THYROID STIM HORMONE: CPT | Performed by: INTERNAL MEDICINE

## 2025-08-11 PROCEDURE — 80061 LIPID PANEL: CPT | Performed by: INTERNAL MEDICINE

## 2025-08-11 PROCEDURE — 36415 COLL VENOUS BLD VENIPUNCTURE: CPT | Performed by: INTERNAL MEDICINE

## 2025-08-11 PROCEDURE — 85025 COMPLETE CBC W/AUTO DIFF WBC: CPT | Performed by: INTERNAL MEDICINE

## 2025-08-13 ENCOUNTER — APPOINTMENT (OUTPATIENT)
Dept: CARDIOLOGY | Age: 85
End: 2025-08-13

## 2025-08-13 VITALS
HEIGHT: 58 IN | DIASTOLIC BLOOD PRESSURE: 80 MMHG | OXYGEN SATURATION: 99 % | SYSTOLIC BLOOD PRESSURE: 132 MMHG | BODY MASS INDEX: 29.99 KG/M2 | WEIGHT: 142.86 LBS | HEART RATE: 63 BPM

## 2025-08-13 DIAGNOSIS — I49.3 FREQUENT PVCS: Primary | ICD-10-CM

## 2025-08-13 PROCEDURE — 93000 ELECTROCARDIOGRAM COMPLETE: CPT | Performed by: STUDENT IN AN ORGANIZED HEALTH CARE EDUCATION/TRAINING PROGRAM

## 2025-08-13 PROCEDURE — 99214 OFFICE O/P EST MOD 30 MIN: CPT | Performed by: STUDENT IN AN ORGANIZED HEALTH CARE EDUCATION/TRAINING PROGRAM

## 2025-08-19 ENCOUNTER — APPOINTMENT (OUTPATIENT)
Dept: CARDIOLOGY | Age: 85
End: 2025-08-19

## 2025-08-20 ENCOUNTER — APPOINTMENT (OUTPATIENT)
Dept: CARDIOLOGY | Age: 85
End: 2025-08-20

## 2025-09-02 PROBLEM — N18.31 TYPE 2 DIABETES MELLITUS WITH STAGE 3A CHRONIC KIDNEY DISEASE, WITHOUT LONG-TERM CURRENT USE OF INSULIN  (CMD): Status: ACTIVE | Noted: 2025-09-02

## 2025-09-02 PROBLEM — I95.1 ORTHOSTATIC HYPOTENSION: Status: ACTIVE | Noted: 2025-09-02

## 2025-09-02 PROBLEM — E11.22 TYPE 2 DIABETES MELLITUS WITH STAGE 3A CHRONIC KIDNEY DISEASE, WITHOUT LONG-TERM CURRENT USE OF INSULIN  (CMD): Status: ACTIVE | Noted: 2025-09-02

## 2025-09-02 PROBLEM — N18.31 STAGE 3A CHRONIC KIDNEY DISEASE  (CMD): Status: ACTIVE | Noted: 2025-09-02

## 2026-02-20 ENCOUNTER — APPOINTMENT (OUTPATIENT)
Dept: CARDIOLOGY | Age: 86
End: 2026-02-20

## 2026-08-19 ENCOUNTER — APPOINTMENT (OUTPATIENT)
Dept: CARDIOLOGY | Age: 86
End: 2026-08-19

## (undated) DEVICE — Device: Brand: DEFENDO AIR/WATER/SUCTION AND BIOPSY VALVE

## (undated) DEVICE — FILTERLINE NASAL ADULT O2/CO2

## (undated) DEVICE — 1200CC GUARDIAN II: Brand: GUARDIAN

## (undated) DEVICE — 3M™ RED DOT™ MONITORING ELECTRODE WITH FOAM TAPE AND STICKY GEL, 50/BAG, 20/CASE, 72/PLT 2570: Brand: RED DOT™

## (undated) DEVICE — ENDOSCOPY PACK - LOWER: Brand: MEDLINE INDUSTRIES, INC.

## (undated) DEVICE — FLUIDGARD® 160 ANTI-FOG SURGICAL MASK WITH ANTI-GLARE SHIELD: Brand: PRECEPT ®

## (undated) NOTE — ED AVS SNAPSHOT
Katelynn Castro   MRN: [id number]    Department:  [facility] Emergency Department   Date of Visit:  12/8/2017           Disclosure     Insurance plans vary and the physician(s) referred by the ER may not be covered by your plan.  Please contact yo tell this physician (or your personal doctor if your instructions are to return to your personal doctor) about any new or lasting problems. The primary care or specialist physician will see patients referred from the [facility] Emergency Department.  Tanisha Frias

## (undated) NOTE — LETTER
Date: 6/26/2024  Patient name: Raissa Pate  YOB: 1940  Medical Record Number: [id number]  Primary Coverage: Payor: MEDICARE / Plan: MEDICARE PART B ONLY / Product Type: *No Product type* /   Secondary Coverage: MEDICAID - MEDICAID  Insurance ID: [id number]  Patient Address: [address] [place] [address]  Telephone Information:   Home Phone [phone]   Mobile [phone]         Encounter Date: 6/26/2024  Provider: HILL Munoz  Diagnosis:     ICD-10-CM   1. Chronic venous hypertension with ulcer and inflammation, left ([facility])  I87.332   2. 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)  L97.325       Progress Note:  CHIEF COMPLAINT:     Chief Complaint   Patient presents with    Wound Care     Follow up for left ankle wound. No complaints at this time.      HPI:   Information obtained from patient and chart  5-8-24 INITIAL  84 yo with pmhx of htn, high cholesterol, venous insufficiency with ulceration.  She presented to [facility] with left foot ulceration, patient was started on Keflex, which patient completed today.  Patient is here today with her son. She has been utilizing neosporin to the wound.  Her recent esr was wnl. Son reports that patient had ulcerations many years ago, and then when she went to [facility] for many years they healed.  She returned here to the [facility] and is living with her son and has redeveloped the ulceration. He states she sleeps in a bed, but spends a lot of time in the kitchen at the counter even though they advise her to sit and elevate her legs.  She has scarring and hypopigmentation noted on the left leg and venous changes bilaterally.  They have seen a Dr. Sinclair (son is not sure what organization md is with) and they will be doing a vein mapping at the end of may.  We discussed that the ulceration is directly over the malleloous. Will get xray. Will start compression and advanced wound dressings.  Today there is not any s/s of infection. Patient does have pain with movement and touch to the area. But no visible movement in the wound bed.    5-15-24 patient returns, she did get xray done, patient with mild soft tissue swelling in hindfoot and small heel spurs noted. Patient came  for rn visit late last week. Edema not significantly reduced, wound is improved and I was able to debride and get it clean enough to transition to collagen. We also discussed compression pumps and they are interested, will start that process. No s/s of infection.    5-22-24 patient returns, last week we debrided the wound and transitioned to collagen.  They trialed the pumps yesterday and patient liked them. Patient also had her vein mapping done yesterday, they don't have results yet. Their appointment with dr. Sniclair is not until end of June, I encouraged them to maybe move that appointment sooner if able.  Today, the wound is slightly smaller and more granular. Patient c/o pain and itching with manipulation of the area and itching at noc. No acute s/s of infection. Will run insurance for epifix.    5-29-24 patient returns with [facility].  The venous reflux study conclusions are below: patient with severe reflux in the left saphenous and left . Follow-up appointment with vascular remains for later in June.  Wound is improved. Epifix is approved. No s/s of infection. Patient states she has pain at the end of the day. Will place epifix today.    6-5-24 patient returns, last week we placed epifix.  They were unable to get Vascular moved up to earlier date.  The wound is smaller today, there is not a small epifix disc available-will utilize sheila. Patient is c/o pain and itching along medial aspect of anterior tibia.  There is slight redness noted there.  Will decrease the wrap to 20-30mmhg and add padding along anterior tibia. Will also treat itching with betamethasone.  No acute s/s of infection. Patient will f/u with   Meghan in my [facility] for epifix placement prn    6-26-24 patient returns with grace.  She has had 2 applications of epifix with good results. Last week it appeared the wound was fragilly resolved, she was kept in compression. She saw cardiology on 6-19 they started flecainide and toprol, also discussed stockings and purusing venaseal/schlerotherapy, patient also referred for memory issues.   Today wound is resolved, will wrap with compression and moist dressing for one more week to allow the tissue to increase in tensile strength.  She has been using her compression pumps.  We will order 2 layer mediven stockings.  Family instructed to bring compression stockings next week and we will transition into them. No s/s of infection, no c/o pain, just itching.  MEDICATIONS:     Current Outpatient Medications:     losartan 25 MG Oral Tab, Take 40 mg by mouth daily., Disp: , Rfl:     Solifenacin Succinate 5 MG Oral Tab, Take 1 tablet (5 mg total) by mouth daily., Disp: , Rfl:     telmisartan 40 MG Oral Tab, Take 1 tablet (40 mg total) by mouth daily., Disp: , Rfl:     cephalexin 500 MG Oral Cap, Take 1 capsule (500 mg total) by mouth 2 (two) times daily. (Patient not taking: Reported on 5/8/2024), Disp: , Rfl:     naproxen 500 MG Oral Tab, Take 1 tablet (500 mg total) by mouth 2 (two) times daily as needed., Disp: 20 tablet, Rfl: 0    metoprolol succinate 12.5 mg Oral Tab, Take 1 Partial Tablet (12.5 mg total) by mouth Daily Beta Blocker., Disp: , Rfl:     Levothyroxine Sodium 100 MCG Oral Tab, Take 1 tablet (100 mcg total) by mouth before breakfast., Disp: , Rfl:     atorvastatin 10 MG Oral Tab, Take 1 tablet (10 mg total) by mouth nightly., Disp: , Rfl:     Meclizine HCl 25 MG Oral Tab, Take 1 tablet (25 mg total) by mouth 3 (three) times daily as needed., Disp: 20 tablet, Rfl: 0  ALLERGIES:   No Known Allergies   REVIEW OF SYSTEMS:   This information was obtained from the patient/family and chart.    See HPI for  pertinent positives, otherwise 10 pt ROS negative.  HISTORY:   Past medical, surgical, family and social history updated where appropriate.    PHYSICAL EXAM:     Vitals:    06/26/24 0700   BP: 123/57   Pulse: (!) 48   Resp: 16   Temp: 98 °F (36.7 °C)         Estimated body mass index is 23.25 kg/m² as calculated from the following:    Height as of 5/8/24: 60\".    Weight as of 5/8/24: 119 lb 0.8 oz (54 kg).   POC Glucose   Date Value Ref Range Status   12/08/2017 128 (H) 65 - 99 mg/dL Final       Vital signs reviewed.Appears stated age, well groomed.    Constitutional:  Bp wnl for patient. Pulse Regular and bradycardic-patient just started new medications with cardiology. Respirations easy and unlabored. Temperature wnl. Elevated bmi. Appearance neat and clean. Appears in no acute distress. Well nourished and well developed.    Lower extremity:  dp/pt palpable left. Left  lower extremity + for varicosities, +edema stable, +hyperpigmentation and hypopigmentation on the left, scarring noted on the left. Capillary refill < 3 seconds. Digits are warm. toenails are wnl for color, thickness and hygeine. Skin hydration wnl. no hairgrowth on legs.    Musculoskeletal:  Gait and station stable   Integumentary:  refer to wound characteristics and images   Psychiatric:  Judgment and insight intact. Alert and oriented times 3. No evidence of depression, anxiety, or agitation. Calm, cooperative, and communicative.   EDEMA:   Calf  Point of Measurement - Left Calf: 30     Left Calf from:: Heel  Calf Left cm:: 32.9        Ankle  Point of Measurement - Left Ankle: 10     Left Ankle from:: Heel  Left Ankle cm:: 18.9              DIAGNOSTICS:     Lab Results   Component Value Date    BUN 24 (H) 05/02/2024    CREATSERUM 1.12 (H) 05/02/2024    ALB 3.5 05/02/2024    TP 8.1 05/02/2024 5-21-24 venous reflux study-advocate  1. There is no evidence of acute or chronic deep vein or superficial vein  thrombosis in the right lower extremity  and left lower extremity where  visualized.  2. There is no evidence of deep vein insufficiency in the right lower  extremity and left lower extremity where visualized.  3. There is no evidence of venous insufficiency in the right anterior  accessory saphenous vein and left anterior accessory saphenous vein.  4. There is no evidence of venous insufficiency in the left small saphenous  vein.  5. Severe reflux in the right great saphenous vein and left great saphenous  vein.  6. Severe reflux in the right small saphenous vein.  7. Severe reflux in the right calf tributaries and left calf tributaries.  8. Severe reflux in the left incompetent .     5-8-24 left ankle xray  CONCLUSION:    Overlying bandage material obscures fine osseous detail.    No appreciable fracture or traumatic malalignment.  The ankle mortise and joint spaces of the hindfoot are preserved.  Small Achilles and plantar calcaneal spurs noted.  Mild soft tissue swelling diffusely about the hindfoot.  No focal areas of   ulceration identified.     WOUND ASSESSMENT:     Wound 05/08/24 #1- Left Medial Ankle Ankle Left (Active)   Date First Assessed/Time First Assessed: 05/08/24 1421    Wound Number (Wound Clinic Only): #1- Left Medial Ankle  Primary Wound Type: Venous Ulcer  Location: Ankle  Wound Location Orientation: Left      Assessments 5/8/2024  2:22 PM 6/26/2024  8:42 AM   Wound Image       Drainage Amount Moderate None   Drainage Description Serous;Yellow --   Treatments Compression --   Wound Length (cm) 2 cm 0.1 cm   Wound Width (cm) 2.4 cm 0.1 cm   Wound Surface Area (cm^2) 4.8 cm^2 0.01 cm^2   Wound Depth (cm) 0.3 cm 0.1 cm   Wound Volume (cm^3) 1.44 cm^3 0.001 cm^3   Wound Healing % -- 100   Margins Well-defined edges Well-defined edges   Non-staged Wound Description Full thickness Full thickness   Meagan-wound Assessment Edema Edema;Hemosiderin staining;Dry   Wound Granulation Tissue -- Pink;Firm   Wound Bed Granulation (%) -- 100 %    Wound Bed Slough (%) 100 % --   Wound Odor None None       Inactive Orders   Date Order Priority Status Authorizing Provider   06/12/24 0940 Cellular tissue product application Venous Ulcer Left Ankle Routine Completed Marce Woodson MD   05/29/24 0927 Cellular tissue product application Venous Ulcer Left Ankle Routine Completed Anay Finn APRN   05/15/24 0925 Debridement Venous Ulcer Left Ankle Routine Completed Anay Finn APRN       Compression Wrap 05/08/24 Ankle Anterior;Left (Active)   Placement Date/Time: 05/08/24 1545   Location: Ankle  Wound Location Orientation: Anterior;Left      Assessments 5/8/2024  3:45 PM 6/19/2024  9:16 AM   Response to Treatment Well tolerated Well tolerated   Compression Layers Multilayer Multilayer   Compression Product Type Unna Boot Unna Boot;Comprilan 12cm   Dressing Applied Yes Yes   Compression Wrap Location Toes to Knee Toes to Knee   Compression Wrap Status Clean;Dry Clean;Dry       No associated orders.          ASSESSMENT AND PLAN:    1. Chronic venous hypertension with ulcer and inflammation, left (HCC)    2. 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  Questions and concerns addressed. Red flags to RTC or ED reviewed.  Patient (or parent) agrees to plan.      NOTE TO PATIENT: The 21st Century Cures Act makes clinical notes like these available to patients in the interest of transparency. Clinical notes are medical documents used by physicians and care providers to communicate with each other. These documents include medical language and terminology, abbreviations, and treatment information that may sound technical and at times possibly unfamiliar. In addition, at times, the verbiage may appear blunt or direct. These documents are one tool providers use to communicate relevant information and clinical opinions of  the care providers in a way that allows common understanding of the clinical context.    I [id number] with the patient. This time included:    preparing to see the patient (eg, review notes and recent diagnostics),  seeing the patient, obtaining and/or reviewing separately obtained history, performing a medically appropriate examination and/or evaluation, counseling and educating the patient, documenting in the record.   DISCHARGE:      Patient Instructions   Please return:  1 week-will order compression stockings, bring them to next appointment     Patient discharge and wound care instructions  Raissa Pate  6/26/2024       You may shower with protection of the wound (ie a cast cover or similar).     Cleansing/dressing:       In clinic/home health care, with each dressing change:    please cleanse the limb, foot, and between toes with soap, water and washcloth.   Dry thoroughly.    Cleanse/soak the wound with VASHE (or other hypochlorous wound cleanser), dab dry.    Apply the following dressings:   betamethasone>xeroform> 20-30mmhg calamine compression with rosadil padding on anterior ankle and anterior tibia    Managing your edema:  Avoid prolonged standing in one place. It is better to have your calf muscles moving to pump fluid out of the legs      Elevate leg(s) above the level of the heart when sitting or as much as possible.  Take you diuretics as directed by your physician. Do not skip doses or change doses unless instructed to do so by your physician.  Decrease salt intake, follow recommended 2 grams daily.  Do not get leg(s) with compression wrap wet. If wraps are too tight as indicated by pain, numbness/tingling or discoloration of toes remove wrap completely and call the wound center @ [phone].  Refer to the \"Multi-layer compression bandage application patient information sheet\" given to you in clinic.     What Are Compression Wraps?   Compression wraps are elastic bandages that wrap  around a part of your body to keep swelling down. The wraps create pressure which pushes extra fluid out of a certain area. This improves blood flow to that part of the body and helps it heal faster.   Compression wraps come in different styles. Your doctor will recommend the best compression wrap for your needs.  How Do I Take Care of a Compression Wrap?   Compression wraps can be worn for up to seven days if you take good care of them. Here's how to make them last and keep them working right:   Keep them clean and dry until your next doctor's appointment.   Wear thin, stretchable stockings over the wrap to hold it in place. This keeps the wrap from sticking to your sheets while sleeping. It also keeps your clothing from sticking to the wrap.   Wear shoes that can fit comfortably around a wrap that covers your leg and foot.       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 Patient has been compliant with 30-40 mmHg compression bandaging, elevation, and exercise over the past 4 weeks, but still presents with significant swelling and hyperpigmentation and ulceration.      Nutrition and blood sugar control:  Focus on the following:  Protein: Meats, beans, eggs, milk and yogurt particularly Greek yogurt), tofu, soy nuts, soy protein products (Follow the protein handout in your welcome folder)  Vitamin C: Citrus fruits and juices, strawberries, tomatoes, tomato juice, peppers, baked potatoes, spinach, broccoli, cauliflower, [place] sprouts, cabbage  Vitamin A: Dark green, leafy vegetables, orange or yellow vegetables, cantaloupe, fortified dairy products, liver, fortified cereals  Zinc: Fortified cereals, red meats, seafood  Consider supplementing with Dao by [employer]. It can be purchased on amazon, [employer] website, or local pharmacy may be able to order it for you.  (These are essential branch chain amino acids that help with tissue building  and wound healing).     Concerns:  Signs of infection may include the following:  Increase in redness  Red \"streaks\" from wound  Increase in swelling  Fever  Unusual odor  Change in the amount of wound drainage     Should you experience any significant changes in your wound(s) or have any questions regarding your home care instructions please contact the wound center [facility] @ [phone] If after regular business hours, please call your family doctor or local emergency room. The treatment plan has been discussed at length between you and your provider. Follow all instructions carefully, it is very important. If you do not follow all instructions you are at risk of your wound not healing, infection, possible loss of limb and even loss of life.    Anay Finn FNP-C, CWCN-AP, CFCN, CSWS, WCC, DWC  6/26/2024         .Weekly Wound Education Note    Teaching Provided To: Patient;Family  Training Topics: Discharge instructions;Dressing;Edema control;Compression  Training Method: Explain/Verbal;Written  Training Response: Reinforcement needed;Patient responds and understands            Wound is fragilely healed.  Folded xeroform applied, calamine unna boot 20-30mmHg with rosidol over anterior leg for protection.  Compression stockings ordered this visit, instructed to bring to next appointment.    Wound Treatment Orders:  No orders of the defined types were placed in this encounter.          Wound Information/Order:  Product:Other Mediven 2 layer compression stockings 30-40mmHg size large  Dispense: 30 days  Dressing Frequency:Change dressing daily and/or PRN    Was a Debridement performed: Yes, Debridement type: mechanical    Compression Stockings ordered: Yes   Product type: Other   Mediven 2 layer compression stockings 30-40mmHg size large  Frequency: daily        Calf  Point of Measurement - Left Calf: 30     Left Calf from:: Heel  Calf Left cm:: 32.9          Ankle  Point of Measurement - Left Ankle: 10      Left Ankle from:: Heel  Left Ankle cm:: 18.9                                                   Notes: Mediven 2 layer compression stockings 30-40mmHg size large.  Dispense as written.